# Patient Record
Sex: MALE | Race: WHITE | NOT HISPANIC OR LATINO | URBAN - METROPOLITAN AREA
[De-identification: names, ages, dates, MRNs, and addresses within clinical notes are randomized per-mention and may not be internally consistent; named-entity substitution may affect disease eponyms.]

---

## 2017-04-05 ENCOUNTER — OUTPATIENT (OUTPATIENT)
Dept: OUTPATIENT SERVICES | Facility: HOSPITAL | Age: 57
LOS: 1 days | End: 2017-04-05
Payer: COMMERCIAL

## 2017-04-05 ENCOUNTER — APPOINTMENT (OUTPATIENT)
Age: 57
End: 2017-04-05

## 2017-04-05 DIAGNOSIS — N20.0 CALCULUS OF KIDNEY: ICD-10-CM

## 2017-04-05 DIAGNOSIS — Z00.8 ENCOUNTER FOR OTHER GENERAL EXAMINATION: ICD-10-CM

## 2017-04-05 PROCEDURE — 76775 US EXAM ABDO BACK WALL LIM: CPT

## 2019-05-02 ENCOUNTER — RECORD ABSTRACTING (OUTPATIENT)
Age: 59
End: 2019-05-02

## 2019-05-02 ENCOUNTER — APPOINTMENT (OUTPATIENT)
Dept: CARDIOLOGY | Facility: CLINIC | Age: 59
End: 2019-05-02
Payer: COMMERCIAL

## 2019-05-02 ENCOUNTER — NON-APPOINTMENT (OUTPATIENT)
Age: 59
End: 2019-05-02

## 2019-05-02 VITALS
OXYGEN SATURATION: 99 % | SYSTOLIC BLOOD PRESSURE: 136 MMHG | HEIGHT: 68 IN | WEIGHT: 170 LBS | RESPIRATION RATE: 15 BRPM | DIASTOLIC BLOOD PRESSURE: 89 MMHG | HEART RATE: 67 BPM | BODY MASS INDEX: 25.76 KG/M2

## 2019-05-02 DIAGNOSIS — R74.8 ABNORMAL LEVELS OF OTHER SERUM ENZYMES: ICD-10-CM

## 2019-05-02 DIAGNOSIS — Z78.9 OTHER SPECIFIED HEALTH STATUS: ICD-10-CM

## 2019-05-02 DIAGNOSIS — Z87.898 PERSONAL HISTORY OF OTHER SPECIFIED CONDITIONS: ICD-10-CM

## 2019-05-02 DIAGNOSIS — I37.1 NONRHEUMATIC PULMONARY VALVE INSUFFICIENCY: ICD-10-CM

## 2019-05-02 DIAGNOSIS — Z82.49 FAMILY HISTORY OF ISCHEMIC HEART DISEASE AND OTHER DISEASES OF THE CIRCULATORY SYSTEM: ICD-10-CM

## 2019-05-02 DIAGNOSIS — I25.3 ANEURYSM OF HEART: ICD-10-CM

## 2019-05-02 DIAGNOSIS — Z00.00 ENCOUNTER FOR GENERAL ADULT MEDICAL EXAMINATION W/OUT ABNORMAL FINDINGS: ICD-10-CM

## 2019-05-02 DIAGNOSIS — I07.1 RHEUMATIC TRICUSPID INSUFFICIENCY: ICD-10-CM

## 2019-05-02 PROCEDURE — 99243 OFF/OP CNSLTJ NEW/EST LOW 30: CPT

## 2019-05-02 PROCEDURE — 93000 ELECTROCARDIOGRAM COMPLETE: CPT

## 2019-05-03 NOTE — REASON FOR VISIT
[FreeTextEntry1] : This is a 59-year-old male who presents here for cardiac evaluation.  His history includes that of:\par 1.  Strong family history of premature coronary artery disease.  \par 2.  Intraatrial septal aneurysm. \par 3.  Mild pulmonic and tricuspid insufficiency. \par \par He exercises somewhat regularly.  Does not experience any exertional chest pain, shortness of breath, palpitations, PND, orthopnea, or edema. He is a bit concerned about hearing that other seemingly healthy people have suddenly collapsed with heart attacks.  \par \par He denies any exertional symptoms.  No other significant medical issues.  \par \par  \par

## 2019-05-03 NOTE — ASSESSMENT
[FreeTextEntry1] : ECG:  Normal sinus rhythm at 63 bpm.  Rare PVC.  1o AV block.  No significant ST-T wave changes. \par \par Laboratory data includes a total cholesterol of 179. \par HDL 68. \par .\par A1C 5.6.  \par AST 87. \par ALT 57.  \par Hemoglobin 15.1.  \par \par Impression:  59-year-old male with a strong family history of premature coronary disease (father CABG in his 60s) presenting here with some minor ECG abnormalities, transaminase abnormalities, and history of some echocardiographic abnormalities including aneurysmal intraatrial septum and mild valvular insufficiency. \par \par I reviewed all of the above with the patient and made suggestions including:\par 1.  Reviewing with Dr. Faulkner the findings of his transaminase elevation. \par 2.  Repeating an echocardiogram to re-look at the echocardiographic abnormalities last seen more than a decade ago. \par 3.  Recommend an exercise stress test to risk stratify him in regard to his very aggressive exercise regimen.  I explained the above to Mr. Jack who agrees and will proceed shortly.  Will review with him the results over the phone.  Thank you for the opportunity to contribute to his care.  \par  \par

## 2019-07-22 ENCOUNTER — APPOINTMENT (OUTPATIENT)
Dept: CARDIOLOGY | Facility: CLINIC | Age: 59
End: 2019-07-22

## 2019-07-29 ENCOUNTER — APPOINTMENT (OUTPATIENT)
Dept: CARDIOLOGY | Facility: CLINIC | Age: 59
End: 2019-07-29
Payer: COMMERCIAL

## 2019-07-29 ENCOUNTER — APPOINTMENT (OUTPATIENT)
Dept: CARDIOLOGY | Facility: CLINIC | Age: 59
End: 2019-07-29

## 2019-07-29 PROCEDURE — 93015 CV STRESS TEST SUPVJ I&R: CPT

## 2019-07-29 PROCEDURE — 93306 TTE W/DOPPLER COMPLETE: CPT

## 2021-02-17 ENCOUNTER — NON-APPOINTMENT (OUTPATIENT)
Age: 61
End: 2021-02-17

## 2021-03-09 ENCOUNTER — APPOINTMENT (OUTPATIENT)
Dept: INTERNAL MEDICINE | Facility: CLINIC | Age: 61
End: 2021-03-09

## 2022-10-12 ENCOUNTER — OFFICE (OUTPATIENT)
Dept: URBAN - METROPOLITAN AREA CLINIC 115 | Facility: CLINIC | Age: 62
Setting detail: OPHTHALMOLOGY
End: 2022-10-12
Payer: COMMERCIAL

## 2022-10-12 DIAGNOSIS — G43.109: ICD-10-CM

## 2022-10-12 DIAGNOSIS — H47.012: ICD-10-CM

## 2022-10-12 DIAGNOSIS — H43.812: ICD-10-CM

## 2022-10-12 DIAGNOSIS — H35.033: ICD-10-CM

## 2022-10-12 DIAGNOSIS — H53.433: ICD-10-CM

## 2022-10-12 DIAGNOSIS — H47.212: ICD-10-CM

## 2022-10-12 DIAGNOSIS — H40.013: ICD-10-CM

## 2022-10-12 PROCEDURE — 76514 ECHO EXAM OF EYE THICKNESS: CPT | Performed by: OPHTHALMOLOGY

## 2022-10-12 PROCEDURE — 92133 CPTRZD OPH DX IMG PST SGM ON: CPT | Performed by: OPHTHALMOLOGY

## 2022-10-12 PROCEDURE — 99203 OFFICE O/P NEW LOW 30 MIN: CPT | Performed by: OPHTHALMOLOGY

## 2022-10-12 PROCEDURE — 92201 OPSCPY EXTND RTA DRAW UNI/BI: CPT | Performed by: OPHTHALMOLOGY

## 2022-10-12 PROCEDURE — 92083 EXTENDED VISUAL FIELD XM: CPT | Performed by: OPHTHALMOLOGY

## 2022-10-12 ASSESSMENT — REFRACTION_AUTOREFRACTION
OS_CYLINDER: -7.25
OD_AXIS: 088
OS_SPHERE: -2.75
OS_AXIS: 072
OD_CYLINDER: -7.00
OD_SPHERE: -2.25

## 2022-10-12 ASSESSMENT — REFRACTION_CURRENTRX
OS_VPRISM_DIRECTION: PROGS
OD_CYLINDER: -6.00
OS_CYLINDER: -4.50
OS_ADD: +2.25
OD_ADD: +2.25
OD_OVR_VA: 20/
OD_VPRISM_DIRECTION: PROGS
OD_AXIS: 077
OS_OVR_VA: 20/
OD_SPHERE: -3.00
OS_AXIS: 071
OS_SPHERE: -4.75

## 2022-10-12 ASSESSMENT — SPHEQUIV_DERIVED
OS_SPHEQUIV: -6.375
OD_SPHEQUIV: -5.75

## 2022-10-12 ASSESSMENT — PACHYMETRY
OS_CT_CORRECTION: 1
OS_CT_UM: 530
OD_CT_CORRECTION: 1
OD_CT_UM: 533

## 2022-10-12 ASSESSMENT — VISUAL ACUITY
OS_BCVA: 20/30-
OD_BCVA: 20/100-

## 2022-10-12 ASSESSMENT — CONFRONTATIONAL VISUAL FIELD TEST (CVF)
OS_FINDINGS: FULL
OD_FINDINGS: FULL

## 2022-11-02 ENCOUNTER — OFFICE (OUTPATIENT)
Dept: URBAN - METROPOLITAN AREA CLINIC 115 | Facility: CLINIC | Age: 62
Setting detail: OPHTHALMOLOGY
End: 2022-11-02
Payer: COMMERCIAL

## 2022-11-02 DIAGNOSIS — H53.433: ICD-10-CM

## 2022-11-02 DIAGNOSIS — H40.1121: ICD-10-CM

## 2022-11-02 DIAGNOSIS — H47.212: ICD-10-CM

## 2022-11-02 DIAGNOSIS — H47.012: ICD-10-CM

## 2022-11-02 DIAGNOSIS — G43.109: ICD-10-CM

## 2022-11-02 DIAGNOSIS — H40.1111: ICD-10-CM

## 2022-11-02 DIAGNOSIS — H43.812: ICD-10-CM

## 2022-11-02 PROBLEM — H40.013 GLAUCOMA SUSPECT, LOW RISK 1-2 FACTORS; BOTH EYES: Status: ACTIVE | Noted: 2022-10-12

## 2022-11-02 PROBLEM — H35.033 HYPERTENSIVE RETINOPATHY; BOTH EYES: Status: ACTIVE | Noted: 2022-10-12

## 2022-11-02 PROCEDURE — 99213 OFFICE O/P EST LOW 20 MIN: CPT | Performed by: OPHTHALMOLOGY

## 2022-11-02 ASSESSMENT — TONOMETRY
OS_IOP_MMHG: 12
OD_IOP_MMHG: 14
OS_IOP_MMHG: 18
OD_IOP_MMHG: 13

## 2022-11-02 ASSESSMENT — REFRACTION_CURRENTRX
OS_CYLINDER: -4.50
OD_CYLINDER: -6.00
OD_ADD: +2.25
OS_ADD: +2.25
OS_SPHERE: -4.75
OS_VPRISM_DIRECTION: PROGS
OS_AXIS: 071
OS_OVR_VA: 20/
OD_SPHERE: -3.00
OD_OVR_VA: 20/
OD_VPRISM_DIRECTION: PROGS
OD_AXIS: 077

## 2022-11-02 ASSESSMENT — VISUAL ACUITY
OD_BCVA: 20/60-1
OS_BCVA: 20/30-1

## 2022-11-02 ASSESSMENT — CONFRONTATIONAL VISUAL FIELD TEST (CVF)
OS_FINDINGS: FULL
OD_FINDINGS: FULL

## 2022-11-02 ASSESSMENT — REFRACTION_AUTOREFRACTION
OD_AXIS: 091
OD_SPHERE: -1.75
OS_CYLINDER: -7.00
OD_CYLINDER: -7.50
OS_AXIS: 082
OS_SPHERE: -3.25

## 2022-11-02 ASSESSMENT — PACHYMETRY
OS_CT_UM: 530
OS_CT_CORRECTION: 1
OD_CT_CORRECTION: 1
OD_CT_UM: 533

## 2022-11-02 ASSESSMENT — SPHEQUIV_DERIVED
OD_SPHEQUIV: -5.5
OS_SPHEQUIV: -6.75

## 2022-11-03 ENCOUNTER — APPOINTMENT (OUTPATIENT)
Dept: CARDIOLOGY | Facility: CLINIC | Age: 62
End: 2022-11-03

## 2022-11-03 VITALS
WEIGHT: 175 LBS | RESPIRATION RATE: 16 BRPM | DIASTOLIC BLOOD PRESSURE: 80 MMHG | BODY MASS INDEX: 26.52 KG/M2 | HEART RATE: 56 BPM | OXYGEN SATURATION: 97 % | HEIGHT: 68 IN | SYSTOLIC BLOOD PRESSURE: 126 MMHG

## 2022-11-03 PROCEDURE — 99214 OFFICE O/P EST MOD 30 MIN: CPT | Mod: 25

## 2022-11-03 PROCEDURE — 93000 ELECTROCARDIOGRAM COMPLETE: CPT

## 2022-11-03 NOTE — HISTORY OF PRESENT ILLNESS
[FreeTextEntry1] : In 2019 patient had an echocardiogram and a stress test, ever, there was no follow-up appointment scheduled.

## 2022-11-03 NOTE — ASSESSMENT
[FreeTextEntry1] : ECG:  Normal sinus rhythm at 56 bpm.  Rare PVC.  1o AV block.  Left anterior fascicular block, LVH, nonspecific T wave normalities\par \par Laboratory data\par ------12/13/18---10/1/8/22\par Chol----179.-------212\par HDL-----68.--------60\par LDL----100.-------141\par Y7Y----8.6.-------5.4  \par AST-----87. \par ALT------57.  \par Hemoglobin 15.1.  \par \par Stress test 7/29/2019:\par Time: 11 minutes (12 METS\par Heart rate: 173   (109%)\par Blood pressure response normal 164/76\par ECG: No ischemic changes.\par Ortega score 11 consistent with low risk\par \par Echocardiogram 7/29/2019:\par Normal LV size and function EF 60 to 65%\par Borderline eft atrial margin\par Moderate mitral valve prolapse and moderate mitral insufficiency\par Mild to moderate pulmonic insufficiency\par \par Impression:  62 -year-old male with a strong family history of premature coronary disease (father CABG in his 60s) presenting here with some minor ECG abnormalities, equivocal hyperlipidemia, transaminase abnormalities, and moderate MR\par 1.  Transaminase elevation has apparently resolved.\par 2.  Moderate mitral regurgitation, which represented an increase over prior.  No signs or symptoms related.\par 3.  Significant increase in LDL\par 4.  A history of obstructive sleep apnea which was getting reassess and currently not being treated\par \par \par Plan\par 1  .Repeat echocardiogram regarding the mitral regurgitation.\par \par 2.  Follow-up with sleep study and recommendations for treatment if necessary.\par \par 3.  Repeat blood work and consideration of statin therapy if persistently elevated.\par \par

## 2022-11-03 NOTE — REASON FOR VISIT
[FreeTextEntry1] : This is a 62 -year-old male who presents here for cardiac evaluation.  His history includes that of:\par 1.  Strong family history of premature coronary artery disease.  \par 2.  Intraatrial septal aneurysm. \par 3.  Mild pulmonic and tricuspid insufficiency. \par \par He exercises somewhat regularly.  Does not experience any exertional chest pain, shortness of breath, palpitations, PND, orthopnea, or edema. \par \par He denies any exertional symptoms.  No other significant medical issues.  \par \par  \par

## 2022-11-22 ENCOUNTER — APPOINTMENT (OUTPATIENT)
Dept: CARDIOLOGY | Facility: CLINIC | Age: 62
End: 2022-11-22

## 2022-11-22 PROCEDURE — 93306 TTE W/DOPPLER COMPLETE: CPT

## 2022-12-02 ENCOUNTER — APPOINTMENT (OUTPATIENT)
Dept: PULMONOLOGY | Facility: CLINIC | Age: 62
End: 2022-12-02

## 2022-12-02 VITALS
BODY MASS INDEX: 26.52 KG/M2 | SYSTOLIC BLOOD PRESSURE: 124 MMHG | OXYGEN SATURATION: 97 % | RESPIRATION RATE: 16 BRPM | WEIGHT: 175 LBS | DIASTOLIC BLOOD PRESSURE: 78 MMHG | HEIGHT: 68 IN | HEART RATE: 78 BPM

## 2022-12-02 PROCEDURE — 99203 OFFICE O/P NEW LOW 30 MIN: CPT

## 2022-12-02 NOTE — CONSULT LETTER
[Dear  ___] : Dear  [unfilled], [Consult Letter:] : I had the pleasure of evaluating your patient, [unfilled]. [Please see my note below.] : Please see my note below. [Consult Closing:] : Thank you very much for allowing me to participate in the care of this patient.  If you have any questions, please do not hesitate to contact me. [Sincerely,] : Sincerely, [DrScotty  ___] : Dr. HARDEN

## 2022-12-27 NOTE — DISCUSSION/SUMMARY
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

## 2022-12-27 NOTE — HISTORY OF PRESENT ILLNESS
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

## 2022-12-27 NOTE — PHYSICAL EXAM
[No Acute Distress] : no acute distress [Enlarged Base of the Tongue] : enlarged base of the tongue [Retrognathia] : retrognathia [TextBox_11] : High arched palate

## 2022-12-28 ENCOUNTER — APPOINTMENT (OUTPATIENT)
Dept: CARDIOLOGY | Facility: CLINIC | Age: 62
End: 2022-12-28

## 2023-02-02 ENCOUNTER — APPOINTMENT (OUTPATIENT)
Dept: PULMONOLOGY | Facility: CLINIC | Age: 63
End: 2023-02-02
Payer: COMMERCIAL

## 2023-02-02 ENCOUNTER — INPATIENT (INPATIENT)
Facility: HOSPITAL | Age: 63
LOS: 2 days | Discharge: HOME | DRG: 176 | End: 2023-02-05
Attending: HOSPITALIST | Admitting: FAMILY MEDICINE
Payer: COMMERCIAL

## 2023-02-02 VITALS
TEMPERATURE: 99 F | HEART RATE: 91 BPM | HEIGHT: 68 IN | RESPIRATION RATE: 18 BRPM | DIASTOLIC BLOOD PRESSURE: 96 MMHG | OXYGEN SATURATION: 98 % | SYSTOLIC BLOOD PRESSURE: 151 MMHG | WEIGHT: 169.98 LBS

## 2023-02-02 DIAGNOSIS — R06.83 SNORING: ICD-10-CM

## 2023-02-02 DIAGNOSIS — G47.19 OTHER HYPERSOMNIA: ICD-10-CM

## 2023-02-02 PROCEDURE — 99442: CPT

## 2023-02-02 PROCEDURE — 99285 EMERGENCY DEPT VISIT HI MDM: CPT

## 2023-02-02 NOTE — ED ADULT TRIAGE NOTE - CHIEF COMPLAINT QUOTE
right sided chest pain and rib pain. Denies SOB, palpitations, fever/chills, nausea, vomiting, abd pain. denies cardiac hx.

## 2023-02-02 NOTE — HISTORY OF PRESENT ILLNESS
[Home] : at home, [unfilled] , at the time of the visit. 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

## 2023-02-03 DIAGNOSIS — I26.99 OTHER PULMONARY EMBOLISM WITHOUT ACUTE COR PULMONALE: ICD-10-CM

## 2023-02-03 LAB
ADD ON TEST-SPECIMEN IN LAB: SIGNIFICANT CHANGE UP
ADD ON TEST-SPECIMEN IN LAB: SIGNIFICANT CHANGE UP
ALBUMIN SERPL ELPH-MCNC: 3.3 G/DL — SIGNIFICANT CHANGE UP (ref 3.3–5)
ALP SERPL-CCNC: 72 U/L — SIGNIFICANT CHANGE UP (ref 40–120)
ALT FLD-CCNC: 31 U/L — SIGNIFICANT CHANGE UP (ref 12–78)
ANION GAP SERPL CALC-SCNC: 5 MMOL/L — SIGNIFICANT CHANGE UP (ref 5–17)
ANION GAP SERPL CALC-SCNC: 5 MMOL/L — SIGNIFICANT CHANGE UP (ref 5–17)
APTT BLD: 151.5 SEC — CRITICAL HIGH (ref 27.5–35.5)
APTT BLD: 29.5 SEC — SIGNIFICANT CHANGE UP (ref 27.5–35.5)
APTT BLD: 44.3 SEC — HIGH (ref 27.5–35.5)
AST SERPL-CCNC: 15 U/L — SIGNIFICANT CHANGE UP (ref 15–37)
BASOPHILS # BLD AUTO: 0.08 K/UL — SIGNIFICANT CHANGE UP (ref 0–0.2)
BASOPHILS # BLD AUTO: 0.08 K/UL — SIGNIFICANT CHANGE UP (ref 0–0.2)
BASOPHILS NFR BLD AUTO: 0.6 % — SIGNIFICANT CHANGE UP (ref 0–2)
BASOPHILS NFR BLD AUTO: 0.7 % — SIGNIFICANT CHANGE UP (ref 0–2)
BILIRUB SERPL-MCNC: 1.4 MG/DL — HIGH (ref 0.2–1.2)
BUN SERPL-MCNC: 17 MG/DL — SIGNIFICANT CHANGE UP (ref 7–23)
BUN SERPL-MCNC: 19 MG/DL — SIGNIFICANT CHANGE UP (ref 7–23)
CALCIUM SERPL-MCNC: 8.9 MG/DL — SIGNIFICANT CHANGE UP (ref 8.5–10.1)
CALCIUM SERPL-MCNC: 9.2 MG/DL — SIGNIFICANT CHANGE UP (ref 8.5–10.1)
CHLORIDE SERPL-SCNC: 103 MMOL/L — SIGNIFICANT CHANGE UP (ref 96–108)
CHLORIDE SERPL-SCNC: 103 MMOL/L — SIGNIFICANT CHANGE UP (ref 96–108)
CO2 SERPL-SCNC: 29 MMOL/L — SIGNIFICANT CHANGE UP (ref 22–31)
CO2 SERPL-SCNC: 30 MMOL/L — SIGNIFICANT CHANGE UP (ref 22–31)
CREAT SERPL-MCNC: 1.21 MG/DL — SIGNIFICANT CHANGE UP (ref 0.5–1.3)
CREAT SERPL-MCNC: 1.23 MG/DL — SIGNIFICANT CHANGE UP (ref 0.5–1.3)
D DIMER BLD IA.RAPID-MCNC: 304 NG/ML DDU — HIGH
EGFR: 66 ML/MIN/1.73M2 — SIGNIFICANT CHANGE UP
EGFR: 67 ML/MIN/1.73M2 — SIGNIFICANT CHANGE UP
EOSINOPHIL # BLD AUTO: 0.42 K/UL — SIGNIFICANT CHANGE UP (ref 0–0.5)
EOSINOPHIL # BLD AUTO: 0.45 K/UL — SIGNIFICANT CHANGE UP (ref 0–0.5)
EOSINOPHIL NFR BLD AUTO: 3.1 % — SIGNIFICANT CHANGE UP (ref 0–6)
EOSINOPHIL NFR BLD AUTO: 3.8 % — SIGNIFICANT CHANGE UP (ref 0–6)
FLUAV AG NPH QL: SIGNIFICANT CHANGE UP
FLUBV AG NPH QL: SIGNIFICANT CHANGE UP
GLUCOSE SERPL-MCNC: 117 MG/DL — HIGH (ref 70–99)
GLUCOSE SERPL-MCNC: 137 MG/DL — HIGH (ref 70–99)
HCT VFR BLD CALC: 46.3 % — SIGNIFICANT CHANGE UP (ref 39–50)
HCT VFR BLD CALC: 47.1 % — SIGNIFICANT CHANGE UP (ref 39–50)
HGB BLD-MCNC: 15.2 G/DL — SIGNIFICANT CHANGE UP (ref 13–17)
HGB BLD-MCNC: 16.1 G/DL — SIGNIFICANT CHANGE UP (ref 13–17)
IMM GRANULOCYTES NFR BLD AUTO: 0.3 % — SIGNIFICANT CHANGE UP (ref 0–0.9)
IMM GRANULOCYTES NFR BLD AUTO: 0.4 % — SIGNIFICANT CHANGE UP (ref 0–0.9)
INR BLD: 1.09 RATIO — SIGNIFICANT CHANGE UP (ref 0.88–1.16)
LYMPHOCYTES # BLD AUTO: 18.5 % — SIGNIFICANT CHANGE UP (ref 13–44)
LYMPHOCYTES # BLD AUTO: 2.54 K/UL — SIGNIFICANT CHANGE UP (ref 1–3.3)
LYMPHOCYTES # BLD AUTO: 2.92 K/UL — SIGNIFICANT CHANGE UP (ref 1–3.3)
LYMPHOCYTES # BLD AUTO: 24.4 % — SIGNIFICANT CHANGE UP (ref 13–44)
MCHC RBC-ENTMCNC: 29.3 PG — SIGNIFICANT CHANGE UP (ref 27–34)
MCHC RBC-ENTMCNC: 30.1 PG — SIGNIFICANT CHANGE UP (ref 27–34)
MCHC RBC-ENTMCNC: 32.8 GM/DL — SIGNIFICANT CHANGE UP (ref 32–36)
MCHC RBC-ENTMCNC: 34.2 GM/DL — SIGNIFICANT CHANGE UP (ref 32–36)
MCV RBC AUTO: 88 FL — SIGNIFICANT CHANGE UP (ref 80–100)
MCV RBC AUTO: 89.2 FL — SIGNIFICANT CHANGE UP (ref 80–100)
MONOCYTES # BLD AUTO: 1.06 K/UL — HIGH (ref 0–0.9)
MONOCYTES # BLD AUTO: 1.28 K/UL — HIGH (ref 0–0.9)
MONOCYTES NFR BLD AUTO: 8.9 % — SIGNIFICANT CHANGE UP (ref 2–14)
MONOCYTES NFR BLD AUTO: 9.3 % — SIGNIFICANT CHANGE UP (ref 2–14)
NEUTROPHILS # BLD AUTO: 7.41 K/UL — HIGH (ref 1.8–7.4)
NEUTROPHILS # BLD AUTO: 9.34 K/UL — HIGH (ref 1.8–7.4)
NEUTROPHILS NFR BLD AUTO: 61.9 % — SIGNIFICANT CHANGE UP (ref 43–77)
NEUTROPHILS NFR BLD AUTO: 68.1 % — SIGNIFICANT CHANGE UP (ref 43–77)
NT-PROBNP SERPL-SCNC: 80 PG/ML — SIGNIFICANT CHANGE UP (ref 0–125)
PLATELET # BLD AUTO: 235 K/UL — SIGNIFICANT CHANGE UP (ref 150–400)
PLATELET # BLD AUTO: 263 K/UL — SIGNIFICANT CHANGE UP (ref 150–400)
POTASSIUM SERPL-MCNC: 3.5 MMOL/L — SIGNIFICANT CHANGE UP (ref 3.5–5.3)
POTASSIUM SERPL-MCNC: 3.7 MMOL/L — SIGNIFICANT CHANGE UP (ref 3.5–5.3)
POTASSIUM SERPL-SCNC: 3.5 MMOL/L — SIGNIFICANT CHANGE UP (ref 3.5–5.3)
POTASSIUM SERPL-SCNC: 3.7 MMOL/L — SIGNIFICANT CHANGE UP (ref 3.5–5.3)
PROT SERPL-MCNC: 7.1 GM/DL — SIGNIFICANT CHANGE UP (ref 6–8.3)
PROTHROM AB SERPL-ACNC: 12.7 SEC — SIGNIFICANT CHANGE UP (ref 10.5–13.4)
RBC # BLD: 5.19 M/UL — SIGNIFICANT CHANGE UP (ref 4.2–5.8)
RBC # BLD: 5.35 M/UL — SIGNIFICANT CHANGE UP (ref 4.2–5.8)
RBC # FLD: 13.2 % — SIGNIFICANT CHANGE UP (ref 10.3–14.5)
RBC # FLD: 13.3 % — SIGNIFICANT CHANGE UP (ref 10.3–14.5)
RSV RNA NPH QL NAA+NON-PROBE: SIGNIFICANT CHANGE UP
SARS-COV-2 RNA SPEC QL NAA+PROBE: SIGNIFICANT CHANGE UP
SODIUM SERPL-SCNC: 137 MMOL/L — SIGNIFICANT CHANGE UP (ref 135–145)
SODIUM SERPL-SCNC: 138 MMOL/L — SIGNIFICANT CHANGE UP (ref 135–145)
TROPONIN I, HIGH SENSITIVITY RESULT: 6.13 NG/L — SIGNIFICANT CHANGE UP
WBC # BLD: 11.96 K/UL — HIGH (ref 3.8–10.5)
WBC # BLD: 13.71 K/UL — HIGH (ref 3.8–10.5)
WBC # FLD AUTO: 11.96 K/UL — HIGH (ref 3.8–10.5)
WBC # FLD AUTO: 13.71 K/UL — HIGH (ref 3.8–10.5)

## 2023-02-03 PROCEDURE — 71275 CT ANGIOGRAPHY CHEST: CPT | Mod: 26,MA

## 2023-02-03 PROCEDURE — 85025 COMPLETE CBC W/AUTO DIFF WBC: CPT

## 2023-02-03 PROCEDURE — 36415 COLL VENOUS BLD VENIPUNCTURE: CPT

## 2023-02-03 PROCEDURE — 93306 TTE W/DOPPLER COMPLETE: CPT

## 2023-02-03 PROCEDURE — 99223 1ST HOSP IP/OBS HIGH 75: CPT

## 2023-02-03 PROCEDURE — 86803 HEPATITIS C AB TEST: CPT

## 2023-02-03 PROCEDURE — 80053 COMPREHEN METABOLIC PANEL: CPT

## 2023-02-03 PROCEDURE — 93970 EXTREMITY STUDY: CPT | Mod: 26

## 2023-02-03 PROCEDURE — 93010 ELECTROCARDIOGRAM REPORT: CPT

## 2023-02-03 PROCEDURE — 97162 PT EVAL MOD COMPLEX 30 MIN: CPT | Mod: GP

## 2023-02-03 PROCEDURE — 83036 HEMOGLOBIN GLYCOSYLATED A1C: CPT

## 2023-02-03 PROCEDURE — 97116 GAIT TRAINING THERAPY: CPT | Mod: GP

## 2023-02-03 PROCEDURE — 85027 COMPLETE CBC AUTOMATED: CPT

## 2023-02-03 PROCEDURE — 80048 BASIC METABOLIC PNL TOTAL CA: CPT

## 2023-02-03 PROCEDURE — 93306 TTE W/DOPPLER COMPLETE: CPT | Mod: 26

## 2023-02-03 PROCEDURE — 97530 THERAPEUTIC ACTIVITIES: CPT | Mod: GP

## 2023-02-03 PROCEDURE — 71045 X-RAY EXAM CHEST 1 VIEW: CPT | Mod: 26

## 2023-02-03 PROCEDURE — 85730 THROMBOPLASTIN TIME PARTIAL: CPT

## 2023-02-03 RX ORDER — OXYCODONE AND ACETAMINOPHEN 5; 325 MG/1; MG/1
1 TABLET ORAL
Qty: 0 | Refills: 0 | DISCHARGE

## 2023-02-03 RX ORDER — LANOLIN ALCOHOL/MO/W.PET/CERES
3 CREAM (GRAM) TOPICAL AT BEDTIME
Refills: 0 | Status: DISCONTINUED | OUTPATIENT
Start: 2023-02-03 | End: 2023-02-05

## 2023-02-03 RX ORDER — HEPARIN SODIUM 5000 [USP'U]/ML
INJECTION INTRAVENOUS; SUBCUTANEOUS
Qty: 25000 | Refills: 0 | Status: DISCONTINUED | OUTPATIENT
Start: 2023-02-03 | End: 2023-02-03

## 2023-02-03 RX ORDER — CYCLOBENZAPRINE HYDROCHLORIDE 10 MG/1
1 TABLET, FILM COATED ORAL
Qty: 0 | Refills: 0 | DISCHARGE

## 2023-02-03 RX ORDER — BRIMONIDINE TARTRATE 2 MG/MG
1 SOLUTION/ DROPS OPHTHALMIC
Refills: 0 | Status: DISCONTINUED | OUTPATIENT
Start: 2023-02-03 | End: 2023-02-05

## 2023-02-03 RX ORDER — CYCLOBENZAPRINE HYDROCHLORIDE 10 MG/1
10 TABLET, FILM COATED ORAL THREE TIMES A DAY
Refills: 0 | Status: DISCONTINUED | OUTPATIENT
Start: 2023-02-03 | End: 2023-02-05

## 2023-02-03 RX ORDER — LATANOPROST 0.05 MG/ML
1 SOLUTION/ DROPS OPHTHALMIC; TOPICAL AT BEDTIME
Refills: 0 | Status: DISCONTINUED | OUTPATIENT
Start: 2023-02-03 | End: 2023-02-05

## 2023-02-03 RX ORDER — APIXABAN 2.5 MG/1
10 TABLET, FILM COATED ORAL
Refills: 0 | Status: DISCONTINUED | OUTPATIENT
Start: 2023-02-03 | End: 2023-02-05

## 2023-02-03 RX ORDER — OXYCODONE AND ACETAMINOPHEN 5; 325 MG/1; MG/1
2 TABLET ORAL EVERY 4 HOURS
Refills: 0 | Status: DISCONTINUED | OUTPATIENT
Start: 2023-02-03 | End: 2023-02-05

## 2023-02-03 RX ORDER — HEPARIN SODIUM 5000 [USP'U]/ML
3000 INJECTION INTRAVENOUS; SUBCUTANEOUS EVERY 6 HOURS
Refills: 0 | Status: DISCONTINUED | OUTPATIENT
Start: 2023-02-03 | End: 2023-02-04

## 2023-02-03 RX ORDER — HEPARIN SODIUM 5000 [USP'U]/ML
6500 INJECTION INTRAVENOUS; SUBCUTANEOUS ONCE
Refills: 0 | Status: COMPLETED | OUTPATIENT
Start: 2023-02-03 | End: 2023-02-03

## 2023-02-03 RX ORDER — ACETAMINOPHEN 500 MG
650 TABLET ORAL EVERY 6 HOURS
Refills: 0 | Status: DISCONTINUED | OUTPATIENT
Start: 2023-02-03 | End: 2023-02-05

## 2023-02-03 RX ORDER — ONDANSETRON 8 MG/1
4 TABLET, FILM COATED ORAL EVERY 8 HOURS
Refills: 0 | Status: DISCONTINUED | OUTPATIENT
Start: 2023-02-03 | End: 2023-02-05

## 2023-02-03 RX ORDER — HEPARIN SODIUM 5000 [USP'U]/ML
6500 INJECTION INTRAVENOUS; SUBCUTANEOUS EVERY 6 HOURS
Refills: 0 | Status: DISCONTINUED | OUTPATIENT
Start: 2023-02-03 | End: 2023-02-03

## 2023-02-03 RX ORDER — ASPIRIN/CALCIUM CARB/MAGNESIUM 324 MG
162 TABLET ORAL ONCE
Refills: 0 | Status: COMPLETED | OUTPATIENT
Start: 2023-02-03 | End: 2023-02-03

## 2023-02-03 RX ADMIN — BRIMONIDINE TARTRATE 1 DROP(S): 2 SOLUTION/ DROPS OPHTHALMIC at 15:06

## 2023-02-03 RX ADMIN — LATANOPROST 1 DROP(S): 0.05 SOLUTION/ DROPS OPHTHALMIC; TOPICAL at 21:57

## 2023-02-03 RX ADMIN — HEPARIN SODIUM 3000 UNIT(S): 5000 INJECTION INTRAVENOUS; SUBCUTANEOUS at 20:24

## 2023-02-03 RX ADMIN — BRIMONIDINE TARTRATE 1 DROP(S): 2 SOLUTION/ DROPS OPHTHALMIC at 21:57

## 2023-02-03 RX ADMIN — HEPARIN SODIUM 1100 UNIT(S)/HR: 5000 INJECTION INTRAVENOUS; SUBCUTANEOUS at 19:06

## 2023-02-03 RX ADMIN — Medication 162 MILLIGRAM(S): at 01:54

## 2023-02-03 RX ADMIN — APIXABAN 10 MILLIGRAM(S): 2.5 TABLET, FILM COATED ORAL at 21:57

## 2023-02-03 RX ADMIN — HEPARIN SODIUM 1400 UNIT(S)/HR: 5000 INJECTION INTRAVENOUS; SUBCUTANEOUS at 03:48

## 2023-02-03 RX ADMIN — HEPARIN SODIUM 1300 UNIT(S)/HR: 5000 INJECTION INTRAVENOUS; SUBCUTANEOUS at 20:13

## 2023-02-03 RX ADMIN — HEPARIN SODIUM 0 UNIT(S)/HR: 5000 INJECTION INTRAVENOUS; SUBCUTANEOUS at 10:31

## 2023-02-03 RX ADMIN — HEPARIN SODIUM 1400 UNIT(S)/HR: 5000 INJECTION INTRAVENOUS; SUBCUTANEOUS at 06:56

## 2023-02-03 RX ADMIN — HEPARIN SODIUM 1100 UNIT(S)/HR: 5000 INJECTION INTRAVENOUS; SUBCUTANEOUS at 12:04

## 2023-02-03 RX ADMIN — HEPARIN SODIUM 6500 UNIT(S): 5000 INJECTION INTRAVENOUS; SUBCUTANEOUS at 03:46

## 2023-02-03 NOTE — PATIENT PROFILE ADULT - FUNCTIONAL ASSESSMENT - BASIC MOBILITY 1.
Dr Mcnair came to assess baby rash. Ok to discharge as anticipated.   4 = No assist / stand by assistance

## 2023-02-03 NOTE — ED ADULT NURSE NOTE - CAS EDN DISCHARGE ASSESSMENT
A (Catheter 6fr Jr5 Crv 100cm Radopq Braid Slct Sup) catheter was used to cross the aortic valve and placed in the left ventricle.  LV pressure was recorded and measured.  Alert and oriented to person, place and time

## 2023-02-03 NOTE — ED PROVIDER NOTE - CLINICAL SUMMARY MEDICAL DECISION MAKING FREE TEXT BOX
62-year-old male with no past medical history presents with acute onset of right-sided chest pain.  Pain is pleuritic in nature.  No associated symptoms.  Pain does not radiate.  No trauma.  No rash to the skin.  Will obtain labs to rule out ACS, pneumonia, pneumothorax as well as PE.  Patient does not PERC out.  Hemodynamically stable.  Labs notable for mildly elevated D-dimer.  CTA chest ordered and results noted with multisegmental right-sided PE.  Discussed results with patient and his wife at bedside.  He added that he had a massage 2 days ago and showed me a picture of a x-ray of his right hip and femur that he had not too long ago for pain radiating down his leg.  Added bilateral lower extremity Dopplers and obtained a actual weight of the patient.  Heparin ordered.  PERC team consulted.  Patient does not need any acute intervention other than anticoagulation at this time.  Admitted to hospitalist on telemetry.  Signout given to Dr. Carlisle.

## 2023-02-03 NOTE — ED ADULT NURSE REASSESSMENT NOTE - NS ED NURSE REASSESS COMMENT FT1
Pt received A&Ox4 VSS afebrile. Pt resting comfortably denies CP/SOB. O2 removed pt satting 97% on room air. Plan of care reviewed, orders pending. Bed assignment pending-breakfast to be ordered. Call bell in reach.

## 2023-02-03 NOTE — H&P ADULT - NSHPREVIEWOFSYSTEMS_GEN_ALL_CORE
REVIEW OF SYSTEMS:    CONSTITUTIONAL: No weakness, fevers or chills  EYES/ENT: No visual changes;  No vertigo or throat pain   NECK: No pain or stiffness  RESPIRATORY: No cough, wheezing, hemoptysis; No shortness of breath, + R sided pleuritic chest pain   CARDIOVASCULAR: No chest pain or palpitations  GASTROINTESTINAL: No abdominal or epigastric pain. No nausea, vomiting, or hematemesis; No diarrhea or constipation. No melena or hematochezia.  GENITOURINARY: No dysuria, frequency or hematuria  NEUROLOGICAL: No numbness or weakness  SKIN: No itching, rashes

## 2023-02-03 NOTE — CONSULT NOTE ADULT - SUBJECTIVE AND OBJECTIVE BOX
PCP:    REQUESTING PHYSICIAN:    REASON FOR CONSULT:    CHIEF COMPLAINT:    HPI:  62 M with Hx lumbar radiculopathy, L peripheral vision loss, presented to ER last night for R sided chest pain which started after dinner. Patient reports watching TV when he suddenly started to experience pain to R side of chest, pain exacerbated with deep inhalation and changes in position. He also reported feeling feverish, with temp 99F checked with thermometer, for which he took tylenol. No other accompanying symptoms reported. No recent trauma/travel/ prolonged immobilization. He reports lower back pain with radiation to R hip and thigh for which he sees ortho, recent Xray with some abnormality, however, MRI of thigh was negative. Pending MRI lower back. He reported going for a deep tissue massage on Monday. No personal or family hx VTE.     In ER, Vitals: BP: 151/96, T: 98.6F, RR: 18/min, HR: 91/min, CT Angio chest: Filling defects identified within right lobar artery extending to right lower lobe proximal segmental as well as right middle lobe segmental to subsegmental branches, started on hepatin gtt.  (03 Feb 2023 08:47). Cardiology called to evaluate PE and R sided strain. Pt denies exertional symptoms prior to this admission. He denies history of CV disease. Echo and labs were evaluated. Dtr at bedside.      PAST MEDICAL & SURGICAL HISTORY:  No pertinent past medical history      No significant past surgical history          Allergies    No Known Allergies    Intolerances        SOCIAL HISTORY:    FAMILY HISTORY:      MEDICATIONS:  MEDICATIONS  (STANDING):  brimonidine 0.2% Ophthalmic Solution 1 Drop(s) Both EYES two times a day  heparin  Infusion.  Unit(s)/Hr (14 mL/Hr) IV Continuous <Continuous>  latanoprost 0.005% Ophthalmic Solution 1 Drop(s) Both EYES at bedtime    MEDICATIONS  (PRN):  acetaminophen     Tablet .. 650 milliGRAM(s) Oral every 6 hours PRN Temp greater or equal to 38C (100.4F), Mild Pain (1 - 3)  aluminum hydroxide/magnesium hydroxide/simethicone Suspension 30 milliLiter(s) Oral every 4 hours PRN Dyspepsia  cyclobenzaprine 10 milliGRAM(s) Oral three times a day PRN Muscle Spasm  heparin   Injectable 6500 Unit(s) IV Push every 6 hours PRN For aPTT less than 40  heparin   Injectable 3000 Unit(s) IV Push every 6 hours PRN For aPTT between 40 - 57  melatonin 3 milliGRAM(s) Oral at bedtime PRN Insomnia  ondansetron Injectable 4 milliGRAM(s) IV Push every 8 hours PRN Nausea and/or Vomiting  oxycodone    5 mG/acetaminophen 325 mG 2 Tablet(s) Oral every 4 hours PRN Severe Pain (7 - 10)  oxycodone    5 mG/acetaminophen 325 mG 1 Tablet(s) Oral every 6 hours PRN Moderate Pain (4 - 6)      REVIEW OF SYSTEMS:    CONSTITUTIONAL: No weakness, fevers or chills  EYES/ENT: No visual changes;  No vertigo or throat pain   NECK: No pain or stiffness  RESPIRATORY: No cough, wheezing, hemoptysis; No shortness of breath  CARDIOVASCULAR: Chest pain  GASTROINTESTINAL: No abdominal or epigastric pain. No nausea, vomiting, or hematemesis; No diarrhea or constipation. No melena or hematochezia.  GENITOURINARY: No dysuria, frequency or hematuria  NEUROLOGICAL: No numbness or weakness  SKIN: No itching, burning, rashes, or lesions   All other review of systems is negative unless indicated above    Vital Signs Last 24 Hrs  T(C): 37.1 (03 Feb 2023 05:00), Max: 37.1 (03 Feb 2023 05:00)  T(F): 98.7 (03 Feb 2023 05:00), Max: 98.7 (03 Feb 2023 05:00)  HR: 58 (03 Feb 2023 06:00) (58 - 95)  BP: 138/91 (03 Feb 2023 06:00) (133/96 - 152/80)  BP(mean): 101 (03 Feb 2023 01:30) (101 - 111)  RR: 16 (03 Feb 2023 07:35) (16 - 22)  SpO2: 95% (03 Feb 2023 07:35) (95% - 98%)    Parameters below as of 03 Feb 2023 07:35  Patient On (Oxygen Delivery Method): room air        I&O's Summary      PHYSICAL EXAM:    Constitutional: NAD, awake and alert, well-developed  HEENT: PERR, EOMI,  No oral cyananosis.  Neck:  supple,  No JVD  Respiratory: Breath sounds are clear bilaterally, No wheezing, rales or rhonchi  Cardiovascular: S1 and S2, regular rate and rhythm, no Murmurs, gallops or rubs  Gastrointestinal: Bowel Sounds present, soft, nontender.   Extremities: No peripheral edema. No clubbing or cyanosis.  Vascular: 2+ peripheral pulses  Neurological: A/O x 3, no focal deficits  Musculoskeletal: no calf tenderness.  Skin: No rashes.      LABS: All Labs Reviewed:                        15.2   11.96 )-----------( 235      ( 03 Feb 2023 08:00 )             46.3                         16.1   13.71 )-----------( 263      ( 03 Feb 2023 00:05 )             47.1     03 Feb 2023 08:00    138    |  103    |  17     ----------------------------<  117    3.7     |  30     |  1.21   03 Feb 2023 00:05    137    |  103    |  19     ----------------------------<  137    3.5     |  29     |  1.23     Ca    9.2        03 Feb 2023 08:00  Ca    8.9        03 Feb 2023 00:05    TPro  7.1    /  Alb  3.3    /  TBili  1.4    /  DBili  x      /  AST  15     /  ALT  31     /  AlkPhos  72     03 Feb 2023 00:05    PT/INR - ( 03 Feb 2023 00:05 )   PT: 12.7 sec;   INR: 1.09 ratio         PTT - ( 03 Feb 2023 00:05 )  PTT:29.5 sec      Blood Culture:   02-03 @ 00:05  Pro Bnp 80        RADIOLOGY/EKG:< from: 12 Lead ECG (02.03.23 @ 00:05) >    Diagnosis Line Normal sinus rhythm  Left anterior fascicular block  Moderate voltage criteria for LVH, may be normal variant  Cannot rule out Septal infarct , age undetermined  Abnormal ECG  No previous ECGs available  Confirmed by Naman Hackett MD ([id number]) on 2/3/2023 9:25:04 AM    < end of copied text >    < from: TTE Echo Complete w/o Contrast w/ Doppler (02.03.23 @ 08:34) >  Impression     Summary     The left ventricle is normal in size, wall thickness, wall motion and   contractility.   Estimated left ventricular ejection fraction is 55-60 %.   The right ventricle is normal in size, wall thickness, wall motion, and   contractility based on TAPSE and tissue doppler.   Normal aortic valve structure.   Mild (1+) aortic regurgitation is present.   Normal appearing tricuspid valve structure and function.   Trace tricuspid valve regurgitation is present.     Signature     ----------------------------------------------------------------   Electronically signed by Eagle Horowitz MD(Interpreting   physician) on 02/03/2023 09:39 AM    < end of copied text >

## 2023-02-03 NOTE — ED ADULT NURSE REASSESSMENT NOTE - NS ED NURSE REASSESS COMMENT FT1
Critical PTT came back-Heparin scanned and held x1 hour and decreased by 3 to 11units/hr. Dr. Vazquez made aware. Plan is to start Eliquis tonight and dc to home tomorrow, pt and wife aware. All needs addressed, report to be given to floor and pt transported.

## 2023-02-03 NOTE — H&P ADULT - CLICK TO LAUNCH ORM
- add phentermine 4 mg in the morning  - continue topiramate 150 mg daily  - drink more water  - no more than 3 can of diet soda per day  - no more than one 8oz glass of juice per day  - walk regularly    - see Lauren Bloch in 1 months  - see me in 3 months    If you have any questions, please do not hesitate to call Weight management clinic at [phone] or [phone]    Sincerely,    Chasidy Juarez MD  Endocrinology         .

## 2023-02-03 NOTE — PATIENT PROFILE ADULT - FALL HARM RISK - UNIVERSAL INTERVENTIONS
Bed in lowest position, wheels locked, appropriate side rails in place/Call bell, personal items and telephone in reach/Instruct patient to call for assistance before getting out of bed or chair/Non-slip footwear when patient is out of bed/[place] to call system/Physically safe environment - no spills, clutter or unnecessary equipment/Purposeful Proactive Rounding/Room/bathroom lighting operational, light cord in reach

## 2023-02-03 NOTE — H&P ADULT - NSHPPHYSICALEXAM_GEN_ALL_CORE
Vital Signs Last 24 Hrs  T(C): 37.1 (03 Feb 2023 05:00), Max: 37.1 (03 Feb 2023 05:00)  T(F): 98.7 (03 Feb 2023 05:00), Max: 98.7 (03 Feb 2023 05:00)  HR: 58 (03 Feb 2023 06:00) (58 - 95)  BP: 138/91 (03 Feb 2023 06:00) (133/96 - 152/80)  BP(mean): 101 (03 Feb 2023 01:30) (101 - 111)  RR: 16 (03 Feb 2023 07:35) (16 - 22)  SpO2: 95% (03 Feb 2023 07:35) (95% - 98%)    Parameters below as of 03 Feb 2023 07:35  Patient On (Oxygen Delivery Method): room air    PHYSICAL EXAM:  GENERAL: NAD, lying in bed comfortably  HEAD:  Atraumatic, Normocephalic  EYES: conjunctiva and sclera clear  ENT: Moist mucous membranes  NECK: Supple, No JVD  CHEST/LUNG: Clear to auscultation bilaterally; No rales, rhonchi, wheezing, or rubs. Unlabored respirations  HEART: Regular rate and rhythm; No murmurs, rubs, or gallops  ABDOMEN: Bowel sounds present; Soft, Nontender, Nondistended.   EXTREMITIES:  2+ Peripheral Pulses, brisk capillary refill. No clubbing, cyanosis, or edema  NERVOUS SYSTEM:  Alert & Oriented X3, speech clear. No deficits   MSK: FROM all 4 extremities, full and equal strength

## 2023-02-03 NOTE — CHART NOTE - NSCHARTNOTEFT_GEN_A_CORE
Contacted by RN that pt Heparin is still running and Pt is due for eliquis at 10 pm. Advised RN to hold eliquis for now since the pt is already on heparin drip. Please transition to DOAC tomorrow as indicated

## 2023-02-03 NOTE — ED ADULT NURSE REASSESSMENT NOTE - NS ED NURSE REASSESS COMMENT FT1
PT LYING IN BED COMFORTABLY POST HEPARIN INFUSION. PT DENIES ANY C/O. VSS. WILL CONTINUE TO MONITOR.

## 2023-02-03 NOTE — CONSULT NOTE ADULT - PROBLEM SELECTOR RECOMMENDATION 9
PE noted on CT. Pt reports pleuritic right sided pain. Echo reveals normal RV without evidence of clot or strain. Plan monitor and treat pain, transition to Eliquis tonight, and consider d/c in am if stable. D/W Dr. Vazquez.

## 2023-02-03 NOTE — H&P ADULT - HISTORY OF PRESENT ILLNESS
62 M with Hx lumbar radiculopathy, L peripheral vision loss, presented to ER last night for R sided chest pain which started after dinner. Patient reports watching TV when he suddenly started to experience pain to R side of chest, pain exacerbated with deep inhalation and changes in position. He also reported feeling feverish, with temp 99F checked with thermometer, for which he took tylenol. No other accompanying symptoms reported. No recent trauma/travel/ prolonged immobilization. He reports lower back pain with radiation to R hip and thigh for which he sees ortho, recent Xray with some abnormality, however, MRI of thigh was negative. Pending MRI lower back. He reported going for a deep tissue massage on Monday. No personal or family hx VTE.     In ER, Vitals: BP: 151/96, T: 98.6F, RR: 18/min, HR: 91/min, CT Angio chest: Filling defects identified within right lobar artery extending to right lower lobe proximal segmental as well as right middle lobe segmental to subsegmental branches, started on hepatin gtt.

## 2023-02-03 NOTE — ED PROVIDER NOTE - NS ED ROS FT
Constitutional: No fever or chills  Eyes: No visual changes  HEENT: No throat pain  CV: + right sided chest pain  Resp: No SOB no cough  GI: No abd pain, nausea or vomiting  : No dysuria  MSK: No musculoskeletal pain  Skin: No rash  Neuro: No headache

## 2023-02-03 NOTE — ED PROVIDER NOTE - PHYSICAL EXAMINATION
***GEN - NAD; well appearing; A+O x3 ***HEAD - NC/AT ***EYES/NOSE - PERRL, EOMI, mucous membranes moist, no discharge ***THROAT: Oral cavity and pharynx normal. No inflammation, swelling, exudate, or lesions.  ***NECK: Neck supple, non-tender  ***PULMONARY - CTA b/l, symmetric breath sounds. ***CARDIAC -s1s2, RRR, no M,G,R  ***ABDOMEN - +BS, ND, NT, soft, no guarding, no rebound, no masses   ***BACK - no CVA tenderness, Normal  spine ***EXTREMITIES - symmetric pulses, 2+ dp, capillary refill < 2 seconds ***SKIN - no rash or bruising   ***NEUROLOGIC - alert, CN 2-12 intact

## 2023-02-03 NOTE — ED ADULT NURSE REASSESSMENT NOTE - NS ED NURSE REASSESS COMMENT FT1
REPORT GIVEN TO MARGARITO SANCHEZ. UPDATED ON PT STATUS AND POC. HEPARIN DRIP VERIFIED. PT LYING IN BED COMFORTABLY. VSS. PIV PATENT AND INTACT. NURSE WILL CONTINUE CARE.

## 2023-02-03 NOTE — ED PROVIDER NOTE - OBJECTIVE STATEMENT
Abdomen, soft, nontender, nondistended, no guarding or rigidity, no masses palpable, normal bowel sounds, Liver and Spleen, no hepatomegaly present, no hepatosplenomegaly, liver nontender, spleen not palpable 62-year-old male with no significant past medical history presents with acute onset of right-sided chest pain.  Patient states he was watching TV when he developed right-sided pain.  Pain is worse when he takes a deep breath in.  No real worsening with movement or position.  Denies any shortness of breath or trauma/injury.  No recent travel.  Patient states that he has been having some pain of his hip and thigh.  Saw an orthopedic surgeon who did an x-ray and then noted some abnormality and what appeared to be the soft tissues so was sending him for an MRI which he has not yet obtained.  He went for a massage on Monday.  No cough or shortness of breath.  Pain does not radiate

## 2023-02-03 NOTE — PHARMACOTHERAPY INTERVENTION NOTE - COMMENTS
Medication reconciliation completed.  Reviewed Medication list and confirmed med allergies with patient; confirmed with Dr. Diaz MedHx.  Pt confirms that he recently started Turmeric OTC and states that his symptoms started at the same time.

## 2023-02-03 NOTE — ED ADULT NURSE NOTE - OBJECTIVE STATEMENT
Patient presented to the ED c/o right sided chest/rib pain. Patient reports pain was sudden onset, sharp, 8 out of 10 pain. Patient denies feeling sob, heart palipitations, n/v/d, dizziness, abd pain. Pt denies pmhx, cardiac hx. Patient breathing even and unlabored.

## 2023-02-03 NOTE — H&P ADULT - TIME BILLING
I spent a total of 70 minutes on the date of this encounter coordinating the patient's care. This includes reviewing prior documentation, results and imaging in addition to completing a full history and physical examination on the patient. Further tests, medications, and procedures have been ordered as indicated. Laboratory results and the plan of care were communicated to the patient and/or their family member. Supporting documentation was completed and added to the patient's chart.

## 2023-02-03 NOTE — H&P ADULT - ASSESSMENT
62 M with Hx lumbar radiculopathy, L peripheral vision loss admitted for pulmonary embolism.     #Acute Pulmonary Embolism  #R sided pleuritic chest pain   -Admit to tele   -CT angio: Filling defects identified within right lobar artery extending to right lower lobe proximal segmental as well as right middle lobe segmental to subsegmental branches, Mild cardiomegaly with enlargement the right cardiac chamber  -TTE ordered, pr reports recent Echo with his cardiologist which was normal   -Currently saturating well on ambient air  -Pro-BNP unremarkable, Trop negative   -Monitor vitals closely  -Continue heparin drip for anticoagulation  -Pain management as per orders   -Will switch to DOAC once pt continues stable/ TTE results available   -Cardiology consult: Mild cardiomegaly with enlargement the right cardiac chamber, pending echo for eval R heart strain     #Lumbar radiculopathy   -Pain management as per orders   -PT eval  -May continue work-up as outpt unless worsening     #Glaucoma   -Continue brimonidine, latanoprost     #VTE ppx  -On heparin gtt     #Advanced directives   -Full Code              62 M with Hx lumbar radiculopathy, L peripheral vision loss admitted for pulmonary embolism.     #Acute Pulmonary Embolism  #R sided pleuritic chest pain   -Admit to tele   -CT angio: Filling defects identified within right lobar artery extending to right lower lobe proximal segmental as well as right middle lobe segmental to subsegmental branches, Mild cardiomegaly with enlargement the right cardiac chamber  -TTE ordered, pr reports recent Echo with his cardiologist which was normal   -Currently saturating well on ambient air  -Pro-BNP unremarkable, Trop negative   -Monitor vitals closely  -Continue heparin drip for anticoagulation  -Pain management as per orders   -Will switch to DOAC once pt continues stable/ TTE results available   -Cardiology consult: Mild cardiomegaly with enlargement the right cardiac chamber, pending echo for eval R heart strain     #Leukocytosis   -Likely secondary to Pulm embolism   -Continue to monitor     #Hyperglycemia  -F/u A1C    #Lumbar radiculopathy   -Pain management as per orders   -PT eval  -May continue work-up as outpt unless worsening     #Glaucoma   -Continue brimonidine, latanoprost     #VTE ppx  -On heparin gtt     #Advanced directives   -Full Code

## 2023-02-04 ENCOUNTER — TRANSCRIPTION ENCOUNTER (OUTPATIENT)
Age: 63
End: 2023-02-04

## 2023-02-04 LAB
A1C WITH ESTIMATED AVERAGE GLUCOSE RESULT: 5.7 % — HIGH (ref 4–5.6)
ALBUMIN SERPL ELPH-MCNC: 2.8 G/DL — LOW (ref 3.3–5)
ALP SERPL-CCNC: 58 U/L — SIGNIFICANT CHANGE UP (ref 40–120)
ALT FLD-CCNC: 23 U/L — SIGNIFICANT CHANGE UP (ref 12–78)
ANION GAP SERPL CALC-SCNC: 4 MMOL/L — LOW (ref 5–17)
AST SERPL-CCNC: 11 U/L — LOW (ref 15–37)
BILIRUB SERPL-MCNC: 2.5 MG/DL — HIGH (ref 0.2–1.2)
BUN SERPL-MCNC: 16 MG/DL — SIGNIFICANT CHANGE UP (ref 7–23)
CALCIUM SERPL-MCNC: 8.9 MG/DL — SIGNIFICANT CHANGE UP (ref 8.5–10.1)
CHLORIDE SERPL-SCNC: 103 MMOL/L — SIGNIFICANT CHANGE UP (ref 96–108)
CO2 SERPL-SCNC: 28 MMOL/L — SIGNIFICANT CHANGE UP (ref 22–31)
CREAT SERPL-MCNC: 1.14 MG/DL — SIGNIFICANT CHANGE UP (ref 0.5–1.3)
EGFR: 72 ML/MIN/1.73M2 — SIGNIFICANT CHANGE UP
ESTIMATED AVERAGE GLUCOSE: 117 MG/DL — HIGH (ref 68–114)
GLUCOSE SERPL-MCNC: 118 MG/DL — HIGH (ref 70–99)
HCT VFR BLD CALC: 43.8 % — SIGNIFICANT CHANGE UP (ref 39–50)
HCV AB S/CO SERPL IA: 0.12 S/CO — SIGNIFICANT CHANGE UP (ref 0–0.99)
HCV AB SERPL-IMP: SIGNIFICANT CHANGE UP
HGB BLD-MCNC: 14.9 G/DL — SIGNIFICANT CHANGE UP (ref 13–17)
MCHC RBC-ENTMCNC: 30.1 PG — SIGNIFICANT CHANGE UP (ref 27–34)
MCHC RBC-ENTMCNC: 34 GM/DL — SIGNIFICANT CHANGE UP (ref 32–36)
MCV RBC AUTO: 88.5 FL — SIGNIFICANT CHANGE UP (ref 80–100)
PLATELET # BLD AUTO: 227 K/UL — SIGNIFICANT CHANGE UP (ref 150–400)
POTASSIUM SERPL-MCNC: 3.7 MMOL/L — SIGNIFICANT CHANGE UP (ref 3.5–5.3)
POTASSIUM SERPL-SCNC: 3.7 MMOL/L — SIGNIFICANT CHANGE UP (ref 3.5–5.3)
PROT SERPL-MCNC: 6.6 GM/DL — SIGNIFICANT CHANGE UP (ref 6–8.3)
RBC # BLD: 4.95 M/UL — SIGNIFICANT CHANGE UP (ref 4.2–5.8)
RBC # FLD: 13.3 % — SIGNIFICANT CHANGE UP (ref 10.3–14.5)
SODIUM SERPL-SCNC: 135 MMOL/L — SIGNIFICANT CHANGE UP (ref 135–145)
WBC # BLD: 13.19 K/UL — HIGH (ref 3.8–10.5)
WBC # FLD AUTO: 13.19 K/UL — HIGH (ref 3.8–10.5)

## 2023-02-04 PROCEDURE — 99233 SBSQ HOSP IP/OBS HIGH 50: CPT

## 2023-02-04 RX ORDER — APIXABAN 2.5 MG/1
2 TABLET, FILM COATED ORAL
Qty: 28 | Refills: 0
Start: 2023-02-04 | End: 2023-02-10

## 2023-02-04 RX ORDER — TRAMADOL HYDROCHLORIDE 50 MG/1
1 TABLET ORAL
Qty: 9 | Refills: 0
Start: 2023-02-04 | End: 2023-02-06

## 2023-02-04 RX ORDER — APIXABAN 2.5 MG/1
1 TABLET, FILM COATED ORAL
Qty: 60 | Refills: 0
Start: 2023-02-04 | End: 2023-03-05

## 2023-02-04 RX ORDER — CYCLOBENZAPRINE HYDROCHLORIDE 10 MG/1
1 TABLET, FILM COATED ORAL
Qty: 9 | Refills: 0
Start: 2023-02-04 | End: 2023-02-06

## 2023-02-04 RX ADMIN — BRIMONIDINE TARTRATE 1 DROP(S): 2 SOLUTION/ DROPS OPHTHALMIC at 21:53

## 2023-02-04 RX ADMIN — APIXABAN 10 MILLIGRAM(S): 2.5 TABLET, FILM COATED ORAL at 21:53

## 2023-02-04 RX ADMIN — BRIMONIDINE TARTRATE 1 DROP(S): 2 SOLUTION/ DROPS OPHTHALMIC at 09:59

## 2023-02-04 RX ADMIN — APIXABAN 10 MILLIGRAM(S): 2.5 TABLET, FILM COATED ORAL at 09:59

## 2023-02-04 NOTE — DISCHARGE NOTE PROVIDER - CARE PROVIDER_API CALL
Elisabeth Fatima  HEMATOLOGY  [address], [place] [address]  Phone: [phone]  Fax: [phone]  Follow Up Time:

## 2023-02-04 NOTE — PHYSICAL THERAPY INITIAL EVALUATION ADULT - SITTING BALANCE: DYNAMIC
----- Message from Anastasiia Grissom sent at 7/18/2018 10:58 AM CDT -----  Contact: Sunil [phone]  Prescription Request:     Name of medication: lovastatin (MEVACOR) 20 MG tablet  Qty 90    Reason for request: Refill    Pharmacy: [facility] [address] [place] [address]    Please advise.    Thank You     good balance

## 2023-02-04 NOTE — DISCHARGE NOTE PROVIDER - HOSPITAL COURSE
62 M with Hx lumbar radiculopathy, L peripheral vision loss, presented to ER last night for R sided chest pain which started after dinner. Patient reports watching TV when he suddenly started to experience pain to R side of chest, pain exacerbated with deep inhalation and changes in position. He also reported feeling feverish, with temp 99F checked with thermometer, for which he took tylenol. No other accompanying symptoms reported. No recent trauma/travel/ prolonged immobilization. He reports lower back pain with radiation to R hip and thigh for which he sees ortho, recent Xray with some abnormality, however, MRI of thigh was negative. Pending MRI lower back. He reported going for a deep tissue massage on Monday. No personal or family hx VTE.     In ER, Vitals: BP: 151/96, T: 98.6F, RR: 18/min, HR: 91/min, CT Angio chest: Filling defects identified within right lobar artery extending to right lower lobe proximal segmental as well as right middle lobe segmental to subsegmental branches, started on hepatin gtt.  (03 Feb 2023 08:47)      Vital Signs Last 24 Hrs  T(C): 36.3 (04 Feb 2023 07:40), Max: 37.7 (03 Feb 2023 19:15)  T(F): 97.4 (04 Feb 2023 07:40), Max: 99.8 (03 Feb 2023 19:15)  HR: 76 (04 Feb 2023 07:40) (76 - 103)  BP: 151/84 (04 Feb 2023 07:40) (134/79 - 151/84)  BP(mean): --  RR: 18 (04 Feb 2023 07:40) (17 - 18)  SpO2: 94% (04 Feb 2023 07:40) (93% - 94%)      PHYSICAL EXAM:    Constitutional: NAD, awake and alert,   HEENT: PERR, EOMI, Normal Hearing, MMM  Neck: Soft and supple, No LAD, No JVD  Respiratory: Breath sounds are clear bilaterally, No wheezing, rales or rhonchi  Cardiovascular: S1 and S2, regular rate and rhythm, no Murmurs, gallops or rubs  Gastrointestinal: Bowel Sounds present, soft, nontender, nondistended, no guarding, no rebound  Extremities: No peripheral edema  Vascular: 2+ peripheral pulses  Neurological: A/O x 3, no focal deficits  Musculoskeletal: 5/5 strength b/l upper and lower extremities  Skin: No rashes      LABS: All Labs Reviewed:    sessment:  · Assessment    62 M with Hx lumbar radiculopathy, L peripheral vision loss admitted for pulmonary embolism.     #Acute Pulmonary Embolism  #R sided pleuritic chest pain   - no tele events  -CT angio: Filling defects identified within right lobar artery extending to right lower lobe proximal segmental as well as right middle lobe segmental to subsegmental branches, Mild cardiomegaly with enlargement the right cardiac chamber  -TTE : no rv strain  -Currently saturating well on ambient air 95% on RA  -Pro-BNP unremarkable, Trop negative   -Monitor vitals closely  -eliquis 10bid for 7 days then 5 bid there after. D/W son who is a nurse  -Pain management as per orders   -heme eval as o/p        #Lumbar radiculopathy   -Pain management as per orders   -PT eval  -May continue work-up as outpt unless worsening     #Glaucoma   -Continue brimonidine, latanoprost       #Advanced directives   -Full Code     dc time: 59 min               62 M with Hx lumbar radiculopathy, L peripheral vision loss, presented to ER last night for R sided chest pain which started after dinner. Patient reports watching TV when he suddenly started to experience pain to R side of chest, pain exacerbated with deep inhalation and changes in position. He also reported feeling feverish, with temp 99F checked with thermometer, for which he took tylenol. No other accompanying symptoms reported. No recent trauma/travel/ prolonged immobilization. He reports lower back pain with radiation to R hip and thigh for which he sees ortho, recent Xray with some abnormality, however, MRI of thigh was negative. Pending MRI lower back. He reported going for a deep tissue massage on Monday. No personal or family hx VTE.     In ER, Vitals: BP: 151/96, T: 98.6F, RR: 18/min, HR: 91/min, CT Angio chest: Filling defects identified within right lobar artery extending to right lower lobe proximal segmental as well as right middle lobe segmental to subsegmental branches, started on hepatin gtt.  (03 Feb 2023 08:47)    ICU Vital Signs Last 24 Hrs  T(C): 37.1 (05 Feb 2023 07:32), Max: 37.3 (04 Feb 2023 19:52)  T(F): 98.8 (05 Feb 2023 07:32), Max: 99.2 (04 Feb 2023 19:52)  HR: 75 (05 Feb 2023 07:32) (75 - 88)  BP: 153/78 (05 Feb 2023 07:32) (143/71 - 153/78)  BP(mean): --  ABP: --  ABP(mean): --  RR: 19 (05 Feb 2023 07:32) (19 - 19)  SpO2: 96% (05 Feb 2023 07:32) (94% - 96%)    O2 Parameters below as of 05 Feb 2023 07:32  Patient On (Oxygen Delivery Method): room air            PHYSICAL EXAM:    Constitutional: NAD, awake and alert,   HEENT: PERR, EOMI, Normal Hearing, MMM  Neck: Soft and supple, No LAD, No JVD  Respiratory: Breath sounds are clear bilaterally, No wheezing, rales or rhonchi  Cardiovascular: S1 and S2, regular rate and rhythm, no Murmurs, gallops or rubs  Gastrointestinal: Bowel Sounds present, soft, nontender, nondistended, no guarding, no rebound  Extremities: No peripheral edema  Vascular: 2+ peripheral pulses  Neurological: A/O x 3, no focal deficits  Musculoskeletal: 5/5 strength b/l upper and lower extremities  Skin: No rashes      LABS: All Labs Reviewed:      · Assessment    62 M with Hx lumbar radiculopathy, L peripheral vision loss admitted for pulmonary embolism.     #Acute Pulmonary Embolism  #R sided pleuritic chest pain     - CT angio: Filling defects identified within right lobar artery extending to right lower lobe proximal segmental as well as right middle lobe segmental to subsegmental branches, Mild cardiomegaly with enlargement the right   cardiac chamber  - TTE : no rv strain  - Currently saturating well on ambient air 95% on RA  - Pro-BNP unremarkable, Trop negative   - Monitor vitals closely  - Eliquis was NOT covered by his insurance-> sent over Rx for Xarelto and was approved. He confirmed that family was able to  meds  - he understands the risks and benefits of doac and has been advised to come to ED if any overt bleeding  - He is to f/u with his pcp in 2 weeks (wants clearance for exercise and travelling) Advised not to travel or start strenuous activity until he is formally cleared b y his pcp  - hematology as o/p for hypercoaguable w/u      #Lumbar radiculopathy   -Pain management as per orders   -PT eval  -May continue work-up as outpt unless worsening     #Glaucoma   -Continue brimonidine, latanoprost       #Advanced directives   -Full Code     dc time: 59 min

## 2023-02-04 NOTE — PHYSICAL THERAPY INITIAL EVALUATION ADULT - GENERAL OBSERVATIONS, REHAB EVAL
Pt seen for 45min PT Eval. Pt rec'd semi supine in bed in NAD. Pt indep in bed mobility, requires SBA for OOB 2/2 pain limp from previous sciatic pain, gait improves with SC. Pt with no acute care PT needs, would benefit from outpatient PT. pt left semi supine all needs met, RN aware.

## 2023-02-04 NOTE — DISCHARGE NOTE PROVIDER - NSDCMRMEDTOKEN_GEN_ALL_CORE_FT
apixaban 5 mg oral tablet: 2 tab(s) orally 2 times a day until 2/10/23   apixaban 5 mg oral tablet: 1 tab(s) orally 2 times a day start on 2/11/23  brimonidine 0.2% ophthalmic solution: 1 drop(s) in each eye 2 times a day  cyclobenzaprine 10 mg oral tablet: 1 tab(s) orally 3 times a day, As needed, Muscle Spasm  latanoprost 0.005% ophthalmic solution: 1 drop(s) in each eye once a day (in the evening)  traMADol 50 mg oral tablet: 1 tab(s) orally every 8 hours, As Needed MDD:3   brimonidine 0.2% ophthalmic solution: 1 drop(s) in each eye 2 times a day  cyclobenzaprine 10 mg oral tablet: 1 tab(s) orally 3 times a day, As needed, Muscle Spasm  latanoprost 0.005% ophthalmic solution: 1 drop(s) in each eye once a day (in the evening)  rivaroxaban 15 mg oral tablet: 1 tab(s) orally 2 times a day until 2/25/23  rivaroxaban 20 mg oral tablet: 1 tab(s) orally once a day BEGIN on 2/26/23  traMADol 50 mg oral tablet: 1 tab(s) orally every 8 hours, As Needed MDD:3

## 2023-02-04 NOTE — PHYSICAL THERAPY INITIAL EVALUATION ADULT - PERTINENT HX OF CURRENT PROBLEM, REHAB EVAL
62 M with Hx lumbar radiculopathy, L peripheral vision loss admitted for pulmonary embolism.     CTA: Filling defects identified within right lobar artery extending to right lower lobe proximal segmental as well as right middle lobe segmental to subsegmental branches, compatible with pulmonary embolism. Mild cardiomegaly with enlargement the right cardiac chamber. Consider correlation with echocardiogram to exclude right heart strain in appropriate clinical setting. Mild bibasilar dependent atelectasis. Suggestion of groundglass attenuation in right middle lobe.

## 2023-02-04 NOTE — DISCHARGE NOTE PROVIDER - CARE PROVIDERS DIRECT ADDRESSES
[email or web address]

## 2023-02-05 ENCOUNTER — TRANSCRIPTION ENCOUNTER (OUTPATIENT)
Age: 63
End: 2023-02-05

## 2023-02-05 VITALS
TEMPERATURE: 99 F | OXYGEN SATURATION: 96 % | DIASTOLIC BLOOD PRESSURE: 78 MMHG | SYSTOLIC BLOOD PRESSURE: 153 MMHG | HEART RATE: 75 BPM | RESPIRATION RATE: 19 BRPM

## 2023-02-05 LAB
HCT VFR BLD CALC: 43.5 % — SIGNIFICANT CHANGE UP (ref 39–50)
HGB BLD-MCNC: 14.8 G/DL — SIGNIFICANT CHANGE UP (ref 13–17)
MCHC RBC-ENTMCNC: 29.8 PG — SIGNIFICANT CHANGE UP (ref 27–34)
MCHC RBC-ENTMCNC: 34 GM/DL — SIGNIFICANT CHANGE UP (ref 32–36)
MCV RBC AUTO: 87.5 FL — SIGNIFICANT CHANGE UP (ref 80–100)
PLATELET # BLD AUTO: 236 K/UL — SIGNIFICANT CHANGE UP (ref 150–400)
RBC # BLD: 4.97 M/UL — SIGNIFICANT CHANGE UP (ref 4.2–5.8)
RBC # FLD: 13.2 % — SIGNIFICANT CHANGE UP (ref 10.3–14.5)
WBC # BLD: 12.37 K/UL — HIGH (ref 3.8–10.5)
WBC # FLD AUTO: 12.37 K/UL — HIGH (ref 3.8–10.5)

## 2023-02-05 PROCEDURE — 99233 SBSQ HOSP IP/OBS HIGH 50: CPT

## 2023-02-05 PROCEDURE — 99239 HOSP IP/OBS DSCHRG MGMT >30: CPT

## 2023-02-05 RX ORDER — RIVAROXABAN 15 MG-20MG
1 KIT ORAL
Qty: 30 | Refills: 0
Start: 2023-02-05

## 2023-02-05 RX ORDER — RIVAROXABAN 15 MG-20MG
1 KIT ORAL
Qty: 42 | Refills: 0
Start: 2023-02-05 | End: 2023-02-25

## 2023-02-05 RX ADMIN — BRIMONIDINE TARTRATE 1 DROP(S): 2 SOLUTION/ DROPS OPHTHALMIC at 09:54

## 2023-02-05 RX ADMIN — APIXABAN 10 MILLIGRAM(S): 2.5 TABLET, FILM COATED ORAL at 09:53

## 2023-02-05 NOTE — DISCHARGE NOTE NURSING/CASE MANAGEMENT/SOCIAL WORK - PATIENT PORTAL LINK FT
You can access the FollowMyHealth Patient Portal offered by [facility] by registering at the following website: [email or web address]. By joining [facility]’s FollowMyHealth portal, you will also be able to view your health information using other applications (apps) compatible with our system.

## 2023-02-05 NOTE — PROGRESS NOTE ADULT - SUBJECTIVE AND OBJECTIVE BOX
PCP:    REQUESTING PHYSICIAN:    REASON FOR CONSULT:    CHIEF COMPLAINT:    HPI:  62 M with Hx lumbar radiculopathy, L peripheral vision loss, presented to ER last night for R sided chest pain which started after dinner. Patient reports watching TV when he suddenly started to experience pain to R side of chest, pain exacerbated with deep inhalation and changes in position. He also reported feeling feverish, with temp 99F checked with thermometer, for which he took tylenol. No other accompanying symptoms reported. No recent trauma/travel/ prolonged immobilization. He reports lower back pain with radiation to R hip and thigh for which he sees ortho, recent Xray with some abnormality, however, MRI of thigh was negative. Pending MRI lower back. He reported going for a deep tissue massage on Monday. No personal or family hx VTE.     In ER, Vitals: BP: 151/96, T: 98.6F, RR: 18/min, HR: 91/min, CT Angio chest: Filling defects identified within right lobar artery extending to right lower lobe proximal segmental as well as right middle lobe segmental to subsegmental branches, started on hepatin gtt.  (03 Feb 2023 08:47). Cardiology called to evaluate PE and R sided strain. Pt denies exertional symptoms prior to this admission. He denies history of CV disease. Echo and labs were evaluated. Dtr at bedside.  2/4/23: Pt continues to experience pleuritic chest pain but improved.         PAST MEDICAL & SURGICAL HISTORY:  No pertinent past medical history      No significant past surgical history          SOCIAL HISTORY:    FAMILY HISTORY:      ALLERGIES:  Allergies    No Known Allergies    Intolerances        MEDICATIONS:    MEDICATIONS  (STANDING):  apixaban 10 milliGRAM(s) Oral <User Schedule>  brimonidine 0.2% Ophthalmic Solution 1 Drop(s) Both EYES two times a day  latanoprost 0.005% Ophthalmic Solution 1 Drop(s) Both EYES at bedtime    MEDICATIONS  (PRN):  acetaminophen     Tablet .. 650 milliGRAM(s) Oral every 6 hours PRN Temp greater or equal to 38C (100.4F), Mild Pain (1 - 3)  aluminum hydroxide/magnesium hydroxide/simethicone Suspension 30 milliLiter(s) Oral every 4 hours PRN Dyspepsia  cyclobenzaprine 10 milliGRAM(s) Oral three times a day PRN Muscle Spasm  melatonin 3 milliGRAM(s) Oral at bedtime PRN Insomnia  ondansetron Injectable 4 milliGRAM(s) IV Push every 8 hours PRN Nausea and/or Vomiting  oxycodone    5 mG/acetaminophen 325 mG 2 Tablet(s) Oral every 4 hours PRN Severe Pain (7 - 10)  oxycodone    5 mG/acetaminophen 325 mG 1 Tablet(s) Oral every 6 hours PRN Moderate Pain (4 - 6)        Vital Signs Last 24 Hrs  T(C): 36.3 (04 Feb 2023 07:40), Max: 37.7 (03 Feb 2023 19:15)  T(F): 97.4 (04 Feb 2023 07:40), Max: 99.8 (03 Feb 2023 19:15)  HR: 76 (04 Feb 2023 07:40) (76 - 103)  BP: 151/84 (04 Feb 2023 07:40) (134/79 - 151/84)  BP(mean): --  RR: 18 (04 Feb 2023 07:40) (17 - 18)  SpO2: 94% (04 Feb 2023 07:40) (93% - 94%)    Parameters below as of 04 Feb 2023 07:40  Patient On (Oxygen Delivery Method): room air    Daily     Daily I&O's Summary      PHYSICAL EXAM:    Constitutional: NAD, awake and alert, well-developed  HEENT: PERR, EOMI,  No oral cyananosis.  Neck:  supple,  No JVD  Respiratory: Breath sounds are clear bilaterally, No wheezing, rales or rhonchi  Cardiovascular: S1 and S2, regular rate and rhythm, no Murmurs, gallops or rubs  Gastrointestinal: Bowel Sounds present, soft, nontender.   Extremities: No peripheral edema. No clubbing or cyanosis.  Vascular: 2+ peripheral pulses  Neurological: A/O x 3, no focal deficits  Musculoskeletal: no calf tenderness.  Skin: No rashes.      LABS: All Labs Reviewed:                        14.9   13.19 )-----------( 227      ( 04 Feb 2023 06:30 )             43.8                         15.2   11.96 )-----------( 235      ( 03 Feb 2023 08:00 )             46.3                         16.1   13.71 )-----------( 263      ( 03 Feb 2023 00:05 )             47.1     04 Feb 2023 06:30    135    |  103    |  16     ----------------------------<  118    3.7     |  28     |  1.14   03 Feb 2023 08:00    138    |  103    |  17     ----------------------------<  117    3.7     |  30     |  1.21   03 Feb 2023 00:05    137    |  103    |  19     ----------------------------<  137    3.5     |  29     |  1.23     Ca    8.9        04 Feb 2023 06:30  Ca    9.2        03 Feb 2023 08:00  Ca    8.9        03 Feb 2023 00:05    TPro  6.6    /  Alb  2.8    /  TBili  2.5    /  DBili  x      /  AST  11     /  ALT  23     /  AlkPhos  58     04 Feb 2023 06:30  TPro  7.1    /  Alb  3.3    /  TBili  1.4    /  DBili  x      /  AST  15     /  ALT  31     /  AlkPhos  72     03 Feb 2023 00:05    PT/INR - ( 03 Feb 2023 00:05 )   PT: 12.7 sec;   INR: 1.09 ratio         PTT - ( 03 Feb 2023 18:48 )  PTT:44.3 sec      Blood Culture:   02-03 @ 00:05  Pro Bnp 80        RADIOLOGY/EKG:< from: 12 Lead ECG (02.03.23 @ 00:05) >    Diagnosis Line Normal sinus rhythm  Left anterior fascicular block  Moderate voltage criteria for LVH, may be normal variant  Cannot rule out Septal infarct , age undetermined  Abnormal ECG  No previous ECGs available  Confirmed by Naman Hackett MD ([id number]) on 2/3/2023 9:25:04 AM    < end of copied text >        ECHO/CARDIAC CATHTERIZATION/STRESS TEST:  < from: TTE Echo Complete w/o Contrast w/ Doppler (02.03.23 @ 08:34) >  Summary     The left ventricle is normal in size, wall thickness, wall motion and   contractility.   Estimated left ventricular ejection fraction is 55-60 %.   The right ventricle is normal in size, wall thickness, wall motion, and   contractility based on TAPSE and tissue doppler.   Normal aortic valve structure.   Mild (1+) aortic regurgitation is present.   Normal appearing tricuspid valve structure and function.   Trace tricuspid valve regurgitation is present.     Signature     ----------------------------------------------------------------   Electronically signed by Eagle Horowitz MD(Interpreting   physician) on 02/03/2023 09:39 AM    < end of copied text >  
PCP:    REQUESTING PHYSICIAN:    REASON FOR CONSULT:    CHIEF COMPLAINT:    HPI:  62 M with Hx lumbar radiculopathy, L peripheral vision loss, presented to ER last night for R sided chest pain which started after dinner. Patient reports watching TV when he suddenly started to experience pain to R side of chest, pain exacerbated with deep inhalation and changes in position. He also reported feeling feverish, with temp 99F checked with thermometer, for which he took tylenol. No other accompanying symptoms reported. No recent trauma/travel/ prolonged immobilization. He reports lower back pain with radiation to R hip and thigh for which he sees ortho, recent Xray with some abnormality, however, MRI of thigh was negative. Pending MRI lower back. He reported going for a deep tissue massage on Monday. No personal or family hx VTE.   In ER, Vitals: BP: 151/96, T: 98.6F, RR: 18/min, HR: 91/min, CT Angio chest: Filling defects identified within right lobar artery extending to right lower lobe proximal segmental as well as right middle lobe segmental to subsegmental branches, started on hepatin gtt.  (03 Feb 2023 08:47). Cardiology called to evaluate PE and R sided strain. Pt denies exertional symptoms prior to this admission. He denies history of CV disease. Echo and labs were evaluated. Dtr at bedside.    2/5/23: pt. with no complaints, Tele: NSR 70s, on AC - xarelto (eliquis not covered); Echo =- preserved EF, normal,  stable for discharge from cardiology stand point       MEDICATIONS:    MEDICATIONS  (STANDING):  apixaban 10 milliGRAM(s) Oral <User Schedule>  brimonidine 0.2% Ophthalmic Solution 1 Drop(s) Both EYES two times a day  latanoprost 0.005% Ophthalmic Solution 1 Drop(s) Both EYES at bedtime      MEDICATIONS  (PRN):  acetaminophen     Tablet .. 650 milliGRAM(s) Oral every 6 hours PRN Temp greater or equal to 38C (100.4F), Mild Pain (1 - 3)  aluminum hydroxide/magnesium hydroxide/simethicone Suspension 30 milliLiter(s) Oral every 4 hours PRN Dyspepsia  cyclobenzaprine 10 milliGRAM(s) Oral three times a day PRN Muscle Spasm  heparin   Injectable 6500 Unit(s) IV Push every 6 hours PRN For aPTT less than 40  heparin   Injectable 3000 Unit(s) IV Push every 6 hours PRN For aPTT between 40 - 57  melatonin 3 milliGRAM(s) Oral at bedtime PRN Insomnia  ondansetron Injectable 4 milliGRAM(s) IV Push every 8 hours PRN Nausea and/or Vomiting  oxycodone    5 mG/acetaminophen 325 mG 2 Tablet(s) Oral every 4 hours PRN Severe Pain (7 - 10)  oxycodone    5 mG/acetaminophen 325 mG 1 Tablet(s) Oral every 6 hours PRN Moderate Pain (4 - 6)    Vital Signs Last 24 Hrs  T(C): 37.1 (05 Feb 2023 07:32), Max: 37.3 (04 Feb 2023 19:52)  T(F): 98.8 (05 Feb 2023 07:32), Max: 99.2 (04 Feb 2023 19:52)  HR: 75 (05 Feb 2023 07:32) (75 - 88)  BP: 153/78 (05 Feb 2023 07:32) (143/71 - 153/78)  BP(mean): --  RR: 19 (05 Feb 2023 07:32) (19 - 19)  SpO2: 96% (05 Feb 2023 07:32) (89% - 96%)    Parameters below as of 05 Feb 2023 07:32  Patient On (Oxygen Delivery Method): room air    PHYSICAL EXAM:    Constitutional: NAD, awake and alert, well-developed  HEENT: PERR, EOMI,  No oral cyananosis.  Neck:  supple,  No JVD  Respiratory: Breath sounds are clear bilaterally, No wheezing, rales or rhonchi  Cardiovascular: S1 and S2, regular rate and rhythm, no Murmurs, gallops or rubs  Gastrointestinal: Bowel Sounds present, soft, nontender.   Extremities: No peripheral edema. No clubbing or cyanosis.  Vascular: 2+ peripheral pulses  Neurological: A/O x 3, no focal deficits  Musculoskeletal: no calf tenderness.  Skin: No rashes.      LABS: All Labs Reviewed:                        15.2   11.96 )-----------( 235      ( 03 Feb 2023 08:00 )             46.3                         16.1   13.71 )-----------( 263      ( 03 Feb 2023 00:05 )             47.1     03 Feb 2023 08:00    138    |  103    |  17     ----------------------------<  117    3.7     |  30     |  1.21   03 Feb 2023 00:05    137    |  103    |  19     ----------------------------<  137    3.5     |  29     |  1.23     Ca    9.2        03 Feb 2023 08:00  Ca    8.9        03 Feb 2023 00:05    TPro  7.1    /  Alb  3.3    /  TBili  1.4    /  DBili  x      /  AST  15     /  ALT  31     /  AlkPhos  72     03 Feb 2023 00:05    PT/INR - ( 03 Feb 2023 00:05 )   PT: 12.7 sec;   INR: 1.09 ratio         PTT - ( 03 Feb 2023 00:05 )  PTT:29.5 sec      Blood Culture:   02-03 @ 00:05  Pro Bnp 80        RADIOLOGY/EKG:< from: 12 Lead ECG (02.03.23 @ 00:05) >    Diagnosis Line Normal sinus rhythm  Left anterior fascicular block  Moderate voltage criteria for LVH, may be normal variant  Cannot rule out Septal infarct , age undetermined  Abnormal ECG  No previous ECGs available  Confirmed by Naman Hackett MD ([id number]) on 2/3/2023 9:25:04 AM    < end of copied text >    < from: TTE Echo Complete w/o Contrast w/ Doppler (02.03.23 @ 08:34) >  Impression     Summary     The left ventricle is normal in size, wall thickness, wall motion and   contractility.   Estimated left ventricular ejection fraction is 55-60 %.   The right ventricle is normal in size, wall thickness, wall motion, and   contractility based on TAPSE and tissue doppler.   Normal aortic valve structure.   Mild (1+) aortic regurgitation is present.   Normal appearing tricuspid valve structure and function.   Trace tricuspid valve regurgitation is present.     Signature     ----------------------------------------------------------------   Electronically signed by Eagle Horowitz MD(Interpreting   physician) on 02/03/2023 09:39 AM    < end of copied text >

## 2023-02-05 NOTE — PROGRESS NOTE ADULT - PROBLEM SELECTOR PLAN 1
pt. asymptomatic, no SOB, Echo - preserved EF, normal  on AC - will be discharged on loading dose xarelto - eliquis not covered     stable for discharge from cardiology standpoint, signed off
Pt symptomatically improved. Continue Eliquis. Stable for discharge from a cardiac point of view.

## 2023-02-06 DIAGNOSIS — M54.16 RADICULOPATHY, LUMBAR REGION: ICD-10-CM

## 2023-02-06 DIAGNOSIS — H53.452 OTHER LOCALIZED VISUAL FIELD DEFECT, LEFT EYE: ICD-10-CM

## 2023-02-07 PROBLEM — Z78.9 OTHER SPECIFIED HEALTH STATUS: Chronic | Status: ACTIVE | Noted: 2023-02-03

## 2023-02-09 DIAGNOSIS — H54.62 UNQUALIFIED VISUAL LOSS, LEFT EYE, NORMAL VISION RIGHT EYE: ICD-10-CM

## 2023-02-09 DIAGNOSIS — R73.9 HYPERGLYCEMIA, UNSPECIFIED: ICD-10-CM

## 2023-02-09 DIAGNOSIS — I51.7 CARDIOMEGALY: ICD-10-CM

## 2023-02-09 DIAGNOSIS — Z20.822 CONTACT WITH AND (SUSPECTED) EXPOSURE TO COVID-19: ICD-10-CM

## 2023-02-09 DIAGNOSIS — M54.16 RADICULOPATHY, LUMBAR REGION: ICD-10-CM

## 2023-02-09 DIAGNOSIS — D72.829 ELEVATED WHITE BLOOD CELL COUNT, UNSPECIFIED: ICD-10-CM

## 2023-02-09 DIAGNOSIS — I26.99 OTHER PULMONARY EMBOLISM WITHOUT ACUTE COR PULMONALE: ICD-10-CM

## 2023-02-09 DIAGNOSIS — H40.9 UNSPECIFIED GLAUCOMA: ICD-10-CM

## 2023-02-23 ENCOUNTER — OUTPATIENT (OUTPATIENT)
Dept: OUTPATIENT SERVICES | Facility: HOSPITAL | Age: 63
LOS: 1 days | Discharge: HOME | End: 2023-02-23

## 2023-02-23 DIAGNOSIS — H54.62 UNQUALIFIED VISUAL LOSS, LEFT EYE, NORMAL VISION RIGHT EYE: ICD-10-CM

## 2023-02-23 DIAGNOSIS — I74.9 EMBOLISM AND THROMBOSIS OF UNSPECIFIED ARTERY: ICD-10-CM

## 2023-02-23 RX ORDER — BRIMONIDINE TARTRATE 2 MG/MG
0.2 SOLUTION/ DROPS OPHTHALMIC
Refills: 0 | Status: ACTIVE | COMMUNITY

## 2023-02-28 ENCOUNTER — TRANSCRIPTION ENCOUNTER (OUTPATIENT)
Age: 63
End: 2023-02-28

## 2023-02-28 ENCOUNTER — LABORATORY RESULT (OUTPATIENT)
Age: 63
End: 2023-02-28

## 2023-02-28 ENCOUNTER — RESULT REVIEW (OUTPATIENT)
Age: 63
End: 2023-02-28

## 2023-02-28 ENCOUNTER — APPOINTMENT (OUTPATIENT)
Dept: HEMATOLOGY ONCOLOGY | Facility: CLINIC | Age: 63
End: 2023-02-28
Payer: COMMERCIAL

## 2023-02-28 ENCOUNTER — NON-APPOINTMENT (OUTPATIENT)
Age: 63
End: 2023-02-28

## 2023-02-28 VITALS
OXYGEN SATURATION: 98 % | RESPIRATION RATE: 18 BRPM | BODY MASS INDEX: 27.35 KG/M2 | HEART RATE: 79 BPM | DIASTOLIC BLOOD PRESSURE: 80 MMHG | HEIGHT: 68 IN | SYSTOLIC BLOOD PRESSURE: 155 MMHG | WEIGHT: 180.44 LBS | TEMPERATURE: 97.9 F

## 2023-02-28 LAB
BASOPHILS # BLD AUTO: 0.11 K/UL — SIGNIFICANT CHANGE UP (ref 0–0.2)
BASOPHILS NFR BLD AUTO: 1.2 % — SIGNIFICANT CHANGE UP (ref 0–2)
EOSINOPHIL # BLD AUTO: 0.58 K/UL — HIGH (ref 0–0.5)
EOSINOPHIL NFR BLD AUTO: 6.3 % — HIGH (ref 0–6)
HCT VFR BLD CALC: 47.6 % — SIGNIFICANT CHANGE UP (ref 39–50)
HGB BLD-MCNC: 15.7 G/DL — SIGNIFICANT CHANGE UP (ref 13–17)
IMM GRANULOCYTES NFR BLD AUTO: 0.3 % — SIGNIFICANT CHANGE UP (ref 0–0.9)
LYMPHOCYTES # BLD AUTO: 2.52 K/UL — SIGNIFICANT CHANGE UP (ref 1–3.3)
LYMPHOCYTES # BLD AUTO: 27.4 % — SIGNIFICANT CHANGE UP (ref 13–44)
MCHC RBC-ENTMCNC: 29.6 PG — SIGNIFICANT CHANGE UP (ref 27–34)
MCHC RBC-ENTMCNC: 33 GM/DL — SIGNIFICANT CHANGE UP (ref 32–36)
MCV RBC AUTO: 89.8 FL — SIGNIFICANT CHANGE UP (ref 80–100)
MONOCYTES # BLD AUTO: 0.62 K/UL — SIGNIFICANT CHANGE UP (ref 0–0.9)
MONOCYTES NFR BLD AUTO: 6.7 % — SIGNIFICANT CHANGE UP (ref 2–14)
NEUTROPHILS # BLD AUTO: 5.33 K/UL — SIGNIFICANT CHANGE UP (ref 1.8–7.4)
NEUTROPHILS NFR BLD AUTO: 58.1 % — SIGNIFICANT CHANGE UP (ref 43–77)
NRBC # BLD: 0 /100 WBCS — SIGNIFICANT CHANGE UP (ref 0–0)
PLATELET # BLD AUTO: 296 K/UL — SIGNIFICANT CHANGE UP (ref 150–400)
RBC # BLD: 5.3 M/UL — SIGNIFICANT CHANGE UP (ref 4.2–5.8)
RBC # FLD: 13.6 % — SIGNIFICANT CHANGE UP (ref 10.3–14.5)
WBC # BLD: 9.19 K/UL — SIGNIFICANT CHANGE UP (ref 3.8–10.5)
WBC # FLD AUTO: 9.19 K/UL — SIGNIFICANT CHANGE UP (ref 3.8–10.5)

## 2023-02-28 PROCEDURE — 99203 OFFICE O/P NEW LOW 30 MIN: CPT

## 2023-02-28 NOTE — HISTORY OF PRESENT ILLNESS
[de-identified] : LEON MEDINA is a 63 y.o. M with a PMH significant for lumbar radiculopathy, left peripheral vision loss, ANNIA, and recent PE 2/3/23, who has been referred to our office for a hypercoag evaluation. \par \par 3/2/23 - 2/5/23 - Patient admitted to  with pleuritic right chest pains.  Low grade temps. No recent trauma/travel/ prolonged immobilization. Found to have PE, no DVT's. \par \par 2/3/23 - CT Angio Chest - Filling defects identified within right lobar artery extending to RLL proximal segmental as well as RML segmental to subsegmental branches, compatible with pulmonary embolism.\par Mild cardiomegaly with enlargement the right cardiac chamber. \par 2/3/23 - B/L LE Dopplers - No evidence of DVT in either lower extremity.\par 2/3/23 - ECHO - No RV strain. \par Discharged on Xarelto.   [de-identified] : Dyspnea has completely resolved.

## 2023-02-28 NOTE — ASSESSMENT
[FreeTextEntry1] : 63-year-old male, currently on Xarelto after presenting with an unprovoked pulmonary embolism.\par Hypercoagulable panel was drawn in our office today.  CT angiogram will be repeated in May, and if clot resolution is demonstrated then anticoagulation will be discontinued.\par We will be in touch regarding results of today's hypercoagulable work-up.

## 2023-03-01 ENCOUNTER — OFFICE (OUTPATIENT)
Dept: URBAN - METROPOLITAN AREA CLINIC 115 | Facility: CLINIC | Age: 63
Setting detail: OPHTHALMOLOGY
End: 2023-03-01
Payer: COMMERCIAL

## 2023-03-01 DIAGNOSIS — H40.1132: ICD-10-CM

## 2023-03-01 DIAGNOSIS — H43.812: ICD-10-CM

## 2023-03-01 DIAGNOSIS — H53.433: ICD-10-CM

## 2023-03-01 DIAGNOSIS — H47.012: ICD-10-CM

## 2023-03-01 DIAGNOSIS — H47.212: ICD-10-CM

## 2023-03-01 DIAGNOSIS — G43.109: ICD-10-CM

## 2023-03-01 LAB
AT III ACT/NOR PPP CHRO: 124 % — SIGNIFICANT CHANGE UP (ref 85–135)
B2 GLYCOPROT1 IGA SER QL: <5 SAU — SIGNIFICANT CHANGE UP
B2 GLYCOPROT1 IGG SER-ACNC: <5 SGU — SIGNIFICANT CHANGE UP
B2 GLYCOPROT1 IGM SER-ACNC: 10.2 SMU — SIGNIFICANT CHANGE UP
CARDIOLIPIN AB SER-ACNC: NEGATIVE — SIGNIFICANT CHANGE UP
CARDIOLIPIN IGM SER-MCNC: 10.9 MPL — SIGNIFICANT CHANGE UP (ref 0–12.5)
CARDIOLIPIN IGM SER-MCNC: 5 GPL — SIGNIFICANT CHANGE UP (ref 0–12.5)
DEPRECATED CARDIOLIPIN IGA SER: <5 APL — SIGNIFICANT CHANGE UP (ref 0–12.5)
HCYS SERPL-MCNC: 12.9 UMOL/L — SIGNIFICANT CHANGE UP
PROT C ACT/NOR PPP: 112 % — SIGNIFICANT CHANGE UP (ref 74–150)
PROT S FREE AG PPP IA-ACNC: 85 % — SIGNIFICANT CHANGE UP (ref 67–141)

## 2023-03-01 PROCEDURE — 99213 OFFICE O/P EST LOW 20 MIN: CPT | Performed by: OPHTHALMOLOGY

## 2023-03-01 PROCEDURE — 92133 CPTRZD OPH DX IMG PST SGM ON: CPT | Performed by: OPHTHALMOLOGY

## 2023-03-01 PROCEDURE — 92083 EXTENDED VISUAL FIELD XM: CPT | Performed by: OPHTHALMOLOGY

## 2023-03-01 ASSESSMENT — TONOMETRY
OD_IOP_MMHG: 11
OS_IOP_MMHG: 10

## 2023-03-01 ASSESSMENT — REFRACTION_AUTOREFRACTION
OS_AXIS: 075
OS_SPHERE: -4.00
OD_AXIS: 086
OS_CYLINDER: -6.00
OD_CYLINDER: -7.00
OD_SPHERE: -2.25

## 2023-03-01 ASSESSMENT — REFRACTION_CURRENTRX
OD_CYLINDER: -6.00
OS_VPRISM_DIRECTION: PROGS
OD_VPRISM_DIRECTION: PROGS
OD_SPHERE: -3.00
OS_SPHERE: -4.75
OD_OVR_VA: 20/
OS_CYLINDER: -4.50
OD_ADD: +2.25
OD_AXIS: 077
OS_AXIS: 071
OS_OVR_VA: 20/
OS_ADD: +2.25

## 2023-03-01 ASSESSMENT — PACHYMETRY
OS_CT_CORRECTION: 1
OD_CT_UM: 533
OD_CT_CORRECTION: 1
OS_CT_UM: 530

## 2023-03-01 ASSESSMENT — SPHEQUIV_DERIVED
OS_SPHEQUIV: -7
OD_SPHEQUIV: -5.75

## 2023-03-01 ASSESSMENT — CONFRONTATIONAL VISUAL FIELD TEST (CVF)
OD_FINDINGS: FULL
OS_FINDINGS: FULL

## 2023-03-01 ASSESSMENT — VISUAL ACUITY
OD_BCVA: 20/60
OS_BCVA: 20/30-1

## 2023-03-02 DIAGNOSIS — I26.99 OTHER PULMONARY EMBOLISM WITHOUT ACUTE COR PULMONALE: ICD-10-CM

## 2023-03-07 ENCOUNTER — NON-APPOINTMENT (OUTPATIENT)
Age: 63
End: 2023-03-07

## 2023-05-21 ENCOUNTER — OUTPATIENT (OUTPATIENT)
Dept: OUTPATIENT SERVICES | Facility: HOSPITAL | Age: 63
LOS: 1 days | Discharge: HOME | End: 2023-05-21

## 2023-05-21 DIAGNOSIS — I74.9 EMBOLISM AND THROMBOSIS OF UNSPECIFIED ARTERY: ICD-10-CM

## 2023-05-22 ENCOUNTER — APPOINTMENT (OUTPATIENT)
Dept: PEDIATRIC ALLERGY IMMUNOLOGY | Facility: CLINIC | Age: 63
End: 2023-05-22

## 2023-05-30 ENCOUNTER — APPOINTMENT (OUTPATIENT)
Dept: HEMATOLOGY ONCOLOGY | Facility: CLINIC | Age: 63
End: 2023-05-30
Payer: MEDICARE

## 2023-05-30 VITALS
OXYGEN SATURATION: 98 % | WEIGHT: 180.5 LBS | DIASTOLIC BLOOD PRESSURE: 77 MMHG | RESPIRATION RATE: 18 BRPM | HEIGHT: 68 IN | HEART RATE: 52 BPM | TEMPERATURE: 97.7 F | BODY MASS INDEX: 27.35 KG/M2 | SYSTOLIC BLOOD PRESSURE: 144 MMHG

## 2023-05-30 PROCEDURE — 99213 OFFICE O/P EST LOW 20 MIN: CPT

## 2023-05-30 NOTE — HISTORY OF PRESENT ILLNESS
[de-identified] : \par LEON MEDINA is a 63 y.o. M with a PMH significant for lumbar radiculopathy, left peripheral vision loss, ANNIA, and recent PE 2/3/23, who has been referred to our office for a hypercoag evaluation. \par \par 3/2/23 - 2/5/23 - Patient admitted to  with pleuritic right chest pains. Low grade temps. No recent trauma/travel/ prolonged immobilization. Found to have PE, no DVT's. \par \par 2/3/23 - CT Angio Chest - Filling defects identified within right lobar artery extending to RLL proximal segmental as well as RML segmental to subsegmental branches, compatible with pulmonary embolism.\par Mild cardiomegaly with enlargement the right cardiac chamber. \par 2/3/23 - B/L LE Dopplers - No evidence of DVT in either lower extremity.\par 2/3/23 - ECHO - No RV strain. \par Discharged on Xarelto. \par \par  Dyspnea has completely resolved. \par \par  [de-identified] : Completed Xarelto 2 weeks ago, remains completely asymptomatic.\par Hypercoag panel was negative.

## 2023-05-30 NOTE — ASSESSMENT
[FreeTextEntry1] : \par 63-year-old male, has completed 3 months of Xarelto after presenting with an unprovoked pulmonary embolism.\par Hypercoagulable panel was negative. \par No further clinical studies or continued anticoagulation required.\par Available for follow-up as needed.\par \par

## 2023-05-31 DIAGNOSIS — I26.99 OTHER PULMONARY EMBOLISM WITHOUT ACUTE COR PULMONALE: ICD-10-CM

## 2023-07-19 ENCOUNTER — RX ONLY (RX ONLY)
Age: 63
End: 2023-07-19

## 2023-07-19 ENCOUNTER — OFFICE (OUTPATIENT)
Dept: URBAN - METROPOLITAN AREA CLINIC 115 | Facility: CLINIC | Age: 63
Setting detail: OPHTHALMOLOGY
End: 2023-07-19
Payer: MEDICARE

## 2023-07-19 DIAGNOSIS — H47.212: ICD-10-CM

## 2023-07-19 DIAGNOSIS — G43.109: ICD-10-CM

## 2023-07-19 DIAGNOSIS — H25.13: ICD-10-CM

## 2023-07-19 DIAGNOSIS — H40.1132: ICD-10-CM

## 2023-07-19 DIAGNOSIS — H47.012: ICD-10-CM

## 2023-07-19 DIAGNOSIS — H43.812: ICD-10-CM

## 2023-07-19 DIAGNOSIS — H53.433: ICD-10-CM

## 2023-07-19 DIAGNOSIS — H35.033: ICD-10-CM

## 2023-07-19 PROCEDURE — 92014 COMPRE OPH EXAM EST PT 1/>: CPT | Performed by: OPHTHALMOLOGY

## 2023-07-19 PROCEDURE — 92083 EXTENDED VISUAL FIELD XM: CPT | Performed by: OPHTHALMOLOGY

## 2023-07-19 PROCEDURE — 92133 CPTRZD OPH DX IMG PST SGM ON: CPT | Performed by: OPHTHALMOLOGY

## 2023-07-19 ASSESSMENT — VISUAL ACUITY
OD_BCVA: 20/60
OS_BCVA: 20/30

## 2023-07-19 ASSESSMENT — REFRACTION_CURRENTRX
OS_OVR_VA: 20/
OS_ADD: +2.25
OD_AXIS: 077
OD_CYLINDER: -6.00
OS_CYLINDER: -4.50
OD_VPRISM_DIRECTION: PROGS
OD_ADD: +2.25
OS_VPRISM_DIRECTION: PROGS
OS_AXIS: 071
OD_OVR_VA: 20/
OS_SPHERE: -4.75
OD_SPHERE: -3.00

## 2023-07-19 ASSESSMENT — REFRACTION_AUTOREFRACTION
OD_CYLINDER: -7.75
OS_SPHERE: -2.75
OD_SPHERE: -1.25
OS_CYLINDER: -7.50
OD_AXIS: 082
OS_AXIS: 071

## 2023-07-19 ASSESSMENT — PACHYMETRY
OS_CT_CORRECTION: 1
OS_CT_UM: 530
OD_CT_UM: 533
OD_CT_CORRECTION: 1

## 2023-07-19 ASSESSMENT — SPHEQUIV_DERIVED
OS_SPHEQUIV: -6.5
OD_SPHEQUIV: -5.125

## 2023-07-19 ASSESSMENT — TONOMETRY
OD_IOP_MMHG: 18
OS_IOP_MMHG: 17

## 2023-07-19 ASSESSMENT — CONFRONTATIONAL VISUAL FIELD TEST (CVF)
OS_FINDINGS: FULL
OD_FINDINGS: FULL

## 2023-07-24 NOTE — ED PROVIDER NOTE - WR ORDER ID 1
Have You Had Laser Removal Of Brown Spots Before?: has not had previous treatment
When Outside In The Sun, Do You...: mostly burns, rarely tans
[id number]

## 2023-11-29 ENCOUNTER — INPATIENT (INPATIENT)
Facility: HOSPITAL | Age: 63
LOS: 1 days | Discharge: HOME | DRG: 322 | End: 2023-12-01
Attending: STUDENT IN AN ORGANIZED HEALTH CARE EDUCATION/TRAINING PROGRAM | Admitting: STUDENT IN AN ORGANIZED HEALTH CARE EDUCATION/TRAINING PROGRAM
Payer: MEDICARE

## 2023-11-29 VITALS
RESPIRATION RATE: 18 BRPM | SYSTOLIC BLOOD PRESSURE: 141 MMHG | TEMPERATURE: 98 F | DIASTOLIC BLOOD PRESSURE: 84 MMHG | HEART RATE: 71 BPM | HEIGHT: 68 IN | OXYGEN SATURATION: 99 % | WEIGHT: 179.9 LBS

## 2023-11-29 LAB
ADD ON TEST-SPECIMEN IN LAB: SIGNIFICANT CHANGE UP
ADD ON TEST-SPECIMEN IN LAB: SIGNIFICANT CHANGE UP
ALBUMIN SERPL ELPH-MCNC: 3.6 G/DL — SIGNIFICANT CHANGE UP (ref 3.3–5)
ALBUMIN SERPL ELPH-MCNC: 3.6 G/DL — SIGNIFICANT CHANGE UP (ref 3.3–5)
ALP SERPL-CCNC: 60 U/L — SIGNIFICANT CHANGE UP (ref 40–120)
ALP SERPL-CCNC: 60 U/L — SIGNIFICANT CHANGE UP (ref 40–120)
ALT FLD-CCNC: 29 U/L — SIGNIFICANT CHANGE UP (ref 12–78)
ALT FLD-CCNC: 29 U/L — SIGNIFICANT CHANGE UP (ref 12–78)
ANION GAP SERPL CALC-SCNC: 7 MMOL/L — SIGNIFICANT CHANGE UP (ref 5–17)
ANION GAP SERPL CALC-SCNC: 7 MMOL/L — SIGNIFICANT CHANGE UP (ref 5–17)
AST SERPL-CCNC: 33 U/L — SIGNIFICANT CHANGE UP (ref 15–37)
AST SERPL-CCNC: 33 U/L — SIGNIFICANT CHANGE UP (ref 15–37)
BASOPHILS # BLD AUTO: 0.1 K/UL — SIGNIFICANT CHANGE UP (ref 0–0.2)
BASOPHILS # BLD AUTO: 0.1 K/UL — SIGNIFICANT CHANGE UP (ref 0–0.2)
BASOPHILS NFR BLD AUTO: 0.7 % — SIGNIFICANT CHANGE UP (ref 0–2)
BASOPHILS NFR BLD AUTO: 0.7 % — SIGNIFICANT CHANGE UP (ref 0–2)
BILIRUB SERPL-MCNC: 1.1 MG/DL — SIGNIFICANT CHANGE UP (ref 0.2–1.2)
BILIRUB SERPL-MCNC: 1.1 MG/DL — SIGNIFICANT CHANGE UP (ref 0.2–1.2)
BUN SERPL-MCNC: 24 MG/DL — HIGH (ref 7–23)
BUN SERPL-MCNC: 24 MG/DL — HIGH (ref 7–23)
CALCIUM SERPL-MCNC: 9 MG/DL — SIGNIFICANT CHANGE UP (ref 8.5–10.1)
CALCIUM SERPL-MCNC: 9 MG/DL — SIGNIFICANT CHANGE UP (ref 8.5–10.1)
CHLORIDE SERPL-SCNC: 108 MMOL/L — SIGNIFICANT CHANGE UP (ref 96–108)
CHLORIDE SERPL-SCNC: 108 MMOL/L — SIGNIFICANT CHANGE UP (ref 96–108)
CO2 SERPL-SCNC: 28 MMOL/L — SIGNIFICANT CHANGE UP (ref 22–31)
CO2 SERPL-SCNC: 28 MMOL/L — SIGNIFICANT CHANGE UP (ref 22–31)
CREAT SERPL-MCNC: 1.23 MG/DL — SIGNIFICANT CHANGE UP (ref 0.5–1.3)
CREAT SERPL-MCNC: 1.23 MG/DL — SIGNIFICANT CHANGE UP (ref 0.5–1.3)
EGFR: 65 ML/MIN/1.73M2 — SIGNIFICANT CHANGE UP
EGFR: 65 ML/MIN/1.73M2 — SIGNIFICANT CHANGE UP
EOSINOPHIL # BLD AUTO: 0.49 K/UL — SIGNIFICANT CHANGE UP (ref 0–0.5)
EOSINOPHIL # BLD AUTO: 0.49 K/UL — SIGNIFICANT CHANGE UP (ref 0–0.5)
EOSINOPHIL NFR BLD AUTO: 3.6 % — SIGNIFICANT CHANGE UP (ref 0–6)
EOSINOPHIL NFR BLD AUTO: 3.6 % — SIGNIFICANT CHANGE UP (ref 0–6)
GLUCOSE SERPL-MCNC: 121 MG/DL — HIGH (ref 70–99)
GLUCOSE SERPL-MCNC: 121 MG/DL — HIGH (ref 70–99)
HCT VFR BLD CALC: 48.4 % — SIGNIFICANT CHANGE UP (ref 39–50)
HCT VFR BLD CALC: 48.4 % — SIGNIFICANT CHANGE UP (ref 39–50)
HGB BLD-MCNC: 16.2 G/DL — SIGNIFICANT CHANGE UP (ref 13–17)
HGB BLD-MCNC: 16.2 G/DL — SIGNIFICANT CHANGE UP (ref 13–17)
IMM GRANULOCYTES NFR BLD AUTO: 0.3 % — SIGNIFICANT CHANGE UP (ref 0–0.9)
IMM GRANULOCYTES NFR BLD AUTO: 0.3 % — SIGNIFICANT CHANGE UP (ref 0–0.9)
LYMPHOCYTES # BLD AUTO: 15.5 % — SIGNIFICANT CHANGE UP (ref 13–44)
LYMPHOCYTES # BLD AUTO: 15.5 % — SIGNIFICANT CHANGE UP (ref 13–44)
LYMPHOCYTES # BLD AUTO: 2.12 K/UL — SIGNIFICANT CHANGE UP (ref 1–3.3)
LYMPHOCYTES # BLD AUTO: 2.12 K/UL — SIGNIFICANT CHANGE UP (ref 1–3.3)
MAGNESIUM SERPL-MCNC: 2.5 MG/DL — SIGNIFICANT CHANGE UP (ref 1.6–2.6)
MAGNESIUM SERPL-MCNC: 2.5 MG/DL — SIGNIFICANT CHANGE UP (ref 1.6–2.6)
MCHC RBC-ENTMCNC: 29.9 PG — SIGNIFICANT CHANGE UP (ref 27–34)
MCHC RBC-ENTMCNC: 29.9 PG — SIGNIFICANT CHANGE UP (ref 27–34)
MCHC RBC-ENTMCNC: 33.5 GM/DL — SIGNIFICANT CHANGE UP (ref 32–36)
MCHC RBC-ENTMCNC: 33.5 GM/DL — SIGNIFICANT CHANGE UP (ref 32–36)
MCV RBC AUTO: 89.3 FL — SIGNIFICANT CHANGE UP (ref 80–100)
MCV RBC AUTO: 89.3 FL — SIGNIFICANT CHANGE UP (ref 80–100)
MONOCYTES # BLD AUTO: 0.95 K/UL — HIGH (ref 0–0.9)
MONOCYTES # BLD AUTO: 0.95 K/UL — HIGH (ref 0–0.9)
MONOCYTES NFR BLD AUTO: 6.9 % — SIGNIFICANT CHANGE UP (ref 2–14)
MONOCYTES NFR BLD AUTO: 6.9 % — SIGNIFICANT CHANGE UP (ref 2–14)
NEUTROPHILS # BLD AUTO: 9.98 K/UL — HIGH (ref 1.8–7.4)
NEUTROPHILS # BLD AUTO: 9.98 K/UL — HIGH (ref 1.8–7.4)
NEUTROPHILS NFR BLD AUTO: 73 % — SIGNIFICANT CHANGE UP (ref 43–77)
NEUTROPHILS NFR BLD AUTO: 73 % — SIGNIFICANT CHANGE UP (ref 43–77)
NT-PROBNP SERPL-SCNC: 51 PG/ML — SIGNIFICANT CHANGE UP (ref 0–125)
NT-PROBNP SERPL-SCNC: 51 PG/ML — SIGNIFICANT CHANGE UP (ref 0–125)
PLATELET # BLD AUTO: 273 K/UL — SIGNIFICANT CHANGE UP (ref 150–400)
PLATELET # BLD AUTO: 273 K/UL — SIGNIFICANT CHANGE UP (ref 150–400)
POTASSIUM SERPL-MCNC: 4.1 MMOL/L — SIGNIFICANT CHANGE UP (ref 3.5–5.3)
POTASSIUM SERPL-MCNC: 4.1 MMOL/L — SIGNIFICANT CHANGE UP (ref 3.5–5.3)
POTASSIUM SERPL-SCNC: 4.1 MMOL/L — SIGNIFICANT CHANGE UP (ref 3.5–5.3)
POTASSIUM SERPL-SCNC: 4.1 MMOL/L — SIGNIFICANT CHANGE UP (ref 3.5–5.3)
PROT SERPL-MCNC: 7.4 GM/DL — SIGNIFICANT CHANGE UP (ref 6–8.3)
PROT SERPL-MCNC: 7.4 GM/DL — SIGNIFICANT CHANGE UP (ref 6–8.3)
RBC # BLD: 5.42 M/UL — SIGNIFICANT CHANGE UP (ref 4.2–5.8)
RBC # BLD: 5.42 M/UL — SIGNIFICANT CHANGE UP (ref 4.2–5.8)
RBC # FLD: 12.9 % — SIGNIFICANT CHANGE UP (ref 10.3–14.5)
RBC # FLD: 12.9 % — SIGNIFICANT CHANGE UP (ref 10.3–14.5)
SODIUM SERPL-SCNC: 143 MMOL/L — SIGNIFICANT CHANGE UP (ref 135–145)
SODIUM SERPL-SCNC: 143 MMOL/L — SIGNIFICANT CHANGE UP (ref 135–145)
TROPONIN I, HIGH SENSITIVITY RESULT: 1758.82 NG/L — HIGH
TROPONIN I, HIGH SENSITIVITY RESULT: 1758.82 NG/L — HIGH
WBC # BLD: 13.68 K/UL — HIGH (ref 3.8–10.5)
WBC # BLD: 13.68 K/UL — HIGH (ref 3.8–10.5)
WBC # FLD AUTO: 13.68 K/UL — HIGH (ref 3.8–10.5)
WBC # FLD AUTO: 13.68 K/UL — HIGH (ref 3.8–10.5)

## 2023-11-29 PROCEDURE — 86901 BLOOD TYPING SEROLOGIC RH(D): CPT

## 2023-11-29 PROCEDURE — 93458 L HRT ARTERY/VENTRICLE ANGIO: CPT | Mod: 59

## 2023-11-29 PROCEDURE — C1725: CPT

## 2023-11-29 PROCEDURE — C9600: CPT | Mod: RI

## 2023-11-29 PROCEDURE — 86900 BLOOD TYPING SEROLOGIC ABO: CPT

## 2023-11-29 PROCEDURE — 83036 HEMOGLOBIN GLYCOSYLATED A1C: CPT

## 2023-11-29 PROCEDURE — 36415 COLL VENOUS BLD VENIPUNCTURE: CPT

## 2023-11-29 PROCEDURE — C1769: CPT

## 2023-11-29 PROCEDURE — 85027 COMPLETE CBC AUTOMATED: CPT

## 2023-11-29 PROCEDURE — 80048 BASIC METABOLIC PNL TOTAL CA: CPT

## 2023-11-29 PROCEDURE — 86850 RBC ANTIBODY SCREEN: CPT

## 2023-11-29 PROCEDURE — 85730 THROMBOPLASTIN TIME PARTIAL: CPT

## 2023-11-29 PROCEDURE — 99285 EMERGENCY DEPT VISIT HI MDM: CPT

## 2023-11-29 PROCEDURE — 71045 X-RAY EXAM CHEST 1 VIEW: CPT | Mod: 26

## 2023-11-29 PROCEDURE — 93306 TTE W/DOPPLER COMPLETE: CPT

## 2023-11-29 PROCEDURE — 80061 LIPID PANEL: CPT

## 2023-11-29 PROCEDURE — 84484 ASSAY OF TROPONIN QUANT: CPT

## 2023-11-29 PROCEDURE — C1874: CPT

## 2023-11-29 PROCEDURE — C1887: CPT

## 2023-11-29 PROCEDURE — 94660 CPAP INITIATION&MGMT: CPT

## 2023-11-29 PROCEDURE — 83735 ASSAY OF MAGNESIUM: CPT

## 2023-11-29 PROCEDURE — 93005 ELECTROCARDIOGRAM TRACING: CPT

## 2023-11-29 PROCEDURE — C1894: CPT

## 2023-11-29 PROCEDURE — 93010 ELECTROCARDIOGRAM REPORT: CPT | Mod: 76

## 2023-11-29 RX ORDER — CLOPIDOGREL BISULFATE 75 MG/1
300 TABLET, FILM COATED ORAL ONCE
Refills: 0 | Status: COMPLETED | OUTPATIENT
Start: 2023-11-29 | End: 2023-11-29

## 2023-11-29 RX ORDER — LATANOPROST 0.05 MG/ML
1 SOLUTION/ DROPS OPHTHALMIC; TOPICAL
Qty: 0 | Refills: 0 | DISCHARGE

## 2023-11-29 RX ORDER — BRIMONIDINE TARTRATE 2 MG/MG
1 SOLUTION/ DROPS OPHTHALMIC
Qty: 0 | Refills: 0 | DISCHARGE

## 2023-11-29 RX ORDER — HEPARIN SODIUM 5000 [USP'U]/ML
INJECTION INTRAVENOUS; SUBCUTANEOUS
Qty: 25000 | Refills: 0 | Status: DISCONTINUED | OUTPATIENT
Start: 2023-11-29 | End: 2023-11-30

## 2023-11-29 RX ORDER — HEPARIN SODIUM 5000 [USP'U]/ML
5000 INJECTION INTRAVENOUS; SUBCUTANEOUS EVERY 6 HOURS
Refills: 0 | Status: DISCONTINUED | OUTPATIENT
Start: 2023-11-29 | End: 2023-11-30

## 2023-11-29 RX ORDER — HEPARIN SODIUM 5000 [USP'U]/ML
5000 INJECTION INTRAVENOUS; SUBCUTANEOUS ONCE
Refills: 0 | Status: COMPLETED | OUTPATIENT
Start: 2023-11-29 | End: 2023-11-29

## 2023-11-29 RX ADMIN — HEPARIN SODIUM 5000 UNIT(S): 5000 INJECTION INTRAVENOUS; SUBCUTANEOUS at 23:44

## 2023-11-29 RX ADMIN — HEPARIN SODIUM 1000 UNIT(S)/HR: 5000 INJECTION INTRAVENOUS; SUBCUTANEOUS at 23:44

## 2023-11-29 RX ADMIN — CLOPIDOGREL BISULFATE 300 MILLIGRAM(S): 75 TABLET, FILM COATED ORAL at 23:19

## 2023-11-29 NOTE — ED ADULT NURSE NOTE - OBJECTIVE STATEMENT
pt presents to er c/o jaw and shoulder pain. pt reports he was at yoga when he started having indigestion, was burping and felt off. pt reports he then started developing R. jaw and R. shoudler pain, and started sweating. pt went to urgent care and was told to come to the ER. pt reports pain is somewhat better, is now complaining of headache. pt also reports when he was in urgent care, his BP was in 200s systolic

## 2023-11-29 NOTE — ED ADULT NURSE NOTE - NSFALLUNIVINTERV_ED_ALL_ED
Bed/Stretcher in lowest position, wheels locked, appropriate side rails in place/Call bell, personal items and telephone in reach/Instruct patient to call for assistance before getting out of bed/chair/stretcher/Non-slip footwear applied when patient is off stretcher/[place] to call system/Physically safe environment - no spills, clutter or unnecessary equipment/Purposeful proactive rounding/Room/bathroom lighting operational, light cord in reach

## 2023-11-29 NOTE — PHARMACOTHERAPY INTERVENTION NOTE - COMMENTS
Medication reconciliation completed.  Reviewed Medication list and confirmed med allergies with patient; confirmed with Dr. First Medahsan.

## 2023-11-29 NOTE — ED ADULT TRIAGE NOTE - CHIEF COMPLAINT QUOTE
Pt c/o R shoulder, R jaw, and epigastric pain starting at 7pm today. Seen at urgent care and given 324mg ASA. HX of kidney stones, PE Feb 2023. Denies chest pain, palpitations, and SOB. STAT EKG to be completed.

## 2023-11-30 DIAGNOSIS — I21.4 NON-ST ELEVATION (NSTEMI) MYOCARDIAL INFARCTION: ICD-10-CM

## 2023-11-30 DIAGNOSIS — R03.0 ELEVATED BLOOD-PRESSURE READING, WITHOUT DIAGNOSIS OF HYPERTENSION: ICD-10-CM

## 2023-11-30 LAB
A1C WITH ESTIMATED AVERAGE GLUCOSE RESULT: 5.4 % — SIGNIFICANT CHANGE UP (ref 4–5.6)
A1C WITH ESTIMATED AVERAGE GLUCOSE RESULT: 5.4 % — SIGNIFICANT CHANGE UP (ref 4–5.6)
ANION GAP SERPL CALC-SCNC: 8 MMOL/L — SIGNIFICANT CHANGE UP (ref 5–17)
ANION GAP SERPL CALC-SCNC: 8 MMOL/L — SIGNIFICANT CHANGE UP (ref 5–17)
APTT BLD: 30.8 SEC — SIGNIFICANT CHANGE UP (ref 24.5–35.6)
APTT BLD: 30.8 SEC — SIGNIFICANT CHANGE UP (ref 24.5–35.6)
APTT BLD: 63.7 SEC — HIGH (ref 24.5–35.6)
APTT BLD: 63.7 SEC — HIGH (ref 24.5–35.6)
BUN SERPL-MCNC: 19 MG/DL — SIGNIFICANT CHANGE UP (ref 7–23)
BUN SERPL-MCNC: 19 MG/DL — SIGNIFICANT CHANGE UP (ref 7–23)
CALCIUM SERPL-MCNC: 8.6 MG/DL — SIGNIFICANT CHANGE UP (ref 8.5–10.1)
CALCIUM SERPL-MCNC: 8.6 MG/DL — SIGNIFICANT CHANGE UP (ref 8.5–10.1)
CHLORIDE SERPL-SCNC: 109 MMOL/L — HIGH (ref 96–108)
CHLORIDE SERPL-SCNC: 109 MMOL/L — HIGH (ref 96–108)
CHOLEST SERPL-MCNC: 212 MG/DL — HIGH
CHOLEST SERPL-MCNC: 212 MG/DL — HIGH
CO2 SERPL-SCNC: 25 MMOL/L — SIGNIFICANT CHANGE UP (ref 22–31)
CO2 SERPL-SCNC: 25 MMOL/L — SIGNIFICANT CHANGE UP (ref 22–31)
CREAT SERPL-MCNC: 1.2 MG/DL — SIGNIFICANT CHANGE UP (ref 0.5–1.3)
CREAT SERPL-MCNC: 1.2 MG/DL — SIGNIFICANT CHANGE UP (ref 0.5–1.3)
EGFR: 67 ML/MIN/1.73M2 — SIGNIFICANT CHANGE UP
EGFR: 67 ML/MIN/1.73M2 — SIGNIFICANT CHANGE UP
ESTIMATED AVERAGE GLUCOSE: 108 MG/DL — SIGNIFICANT CHANGE UP (ref 68–114)
ESTIMATED AVERAGE GLUCOSE: 108 MG/DL — SIGNIFICANT CHANGE UP (ref 68–114)
GLUCOSE SERPL-MCNC: 112 MG/DL — HIGH (ref 70–99)
GLUCOSE SERPL-MCNC: 112 MG/DL — HIGH (ref 70–99)
HCT VFR BLD CALC: 44.6 % — SIGNIFICANT CHANGE UP (ref 39–50)
HCT VFR BLD CALC: 44.6 % — SIGNIFICANT CHANGE UP (ref 39–50)
HDLC SERPL-MCNC: 63 MG/DL — SIGNIFICANT CHANGE UP
HDLC SERPL-MCNC: 63 MG/DL — SIGNIFICANT CHANGE UP
HGB BLD-MCNC: 15 G/DL — SIGNIFICANT CHANGE UP (ref 13–17)
HGB BLD-MCNC: 15 G/DL — SIGNIFICANT CHANGE UP (ref 13–17)
INR BLD: 0.93 RATIO — SIGNIFICANT CHANGE UP (ref 0.85–1.18)
INR BLD: 0.93 RATIO — SIGNIFICANT CHANGE UP (ref 0.85–1.18)
LIPID PNL WITH DIRECT LDL SERPL: 137 MG/DL — HIGH
LIPID PNL WITH DIRECT LDL SERPL: 137 MG/DL — HIGH
MAGNESIUM SERPL-MCNC: 2.2 MG/DL — SIGNIFICANT CHANGE UP (ref 1.6–2.6)
MAGNESIUM SERPL-MCNC: 2.2 MG/DL — SIGNIFICANT CHANGE UP (ref 1.6–2.6)
MCHC RBC-ENTMCNC: 29.6 PG — SIGNIFICANT CHANGE UP (ref 27–34)
MCHC RBC-ENTMCNC: 29.6 PG — SIGNIFICANT CHANGE UP (ref 27–34)
MCHC RBC-ENTMCNC: 33.6 GM/DL — SIGNIFICANT CHANGE UP (ref 32–36)
MCHC RBC-ENTMCNC: 33.6 GM/DL — SIGNIFICANT CHANGE UP (ref 32–36)
MCV RBC AUTO: 88 FL — SIGNIFICANT CHANGE UP (ref 80–100)
MCV RBC AUTO: 88 FL — SIGNIFICANT CHANGE UP (ref 80–100)
NON HDL CHOLESTEROL: 149 MG/DL — HIGH
NON HDL CHOLESTEROL: 149 MG/DL — HIGH
PLATELET # BLD AUTO: 258 K/UL — SIGNIFICANT CHANGE UP (ref 150–400)
PLATELET # BLD AUTO: 258 K/UL — SIGNIFICANT CHANGE UP (ref 150–400)
POTASSIUM SERPL-MCNC: 3.8 MMOL/L — SIGNIFICANT CHANGE UP (ref 3.5–5.3)
POTASSIUM SERPL-MCNC: 3.8 MMOL/L — SIGNIFICANT CHANGE UP (ref 3.5–5.3)
POTASSIUM SERPL-SCNC: 3.8 MMOL/L — SIGNIFICANT CHANGE UP (ref 3.5–5.3)
POTASSIUM SERPL-SCNC: 3.8 MMOL/L — SIGNIFICANT CHANGE UP (ref 3.5–5.3)
PROTHROM AB SERPL-ACNC: 10.5 SEC — SIGNIFICANT CHANGE UP (ref 9.5–13)
PROTHROM AB SERPL-ACNC: 10.5 SEC — SIGNIFICANT CHANGE UP (ref 9.5–13)
RBC # BLD: 5.07 M/UL — SIGNIFICANT CHANGE UP (ref 4.2–5.8)
RBC # BLD: 5.07 M/UL — SIGNIFICANT CHANGE UP (ref 4.2–5.8)
RBC # FLD: 12.8 % — SIGNIFICANT CHANGE UP (ref 10.3–14.5)
RBC # FLD: 12.8 % — SIGNIFICANT CHANGE UP (ref 10.3–14.5)
SODIUM SERPL-SCNC: 142 MMOL/L — SIGNIFICANT CHANGE UP (ref 135–145)
SODIUM SERPL-SCNC: 142 MMOL/L — SIGNIFICANT CHANGE UP (ref 135–145)
TRIGL SERPL-MCNC: 69 MG/DL — SIGNIFICANT CHANGE UP
TRIGL SERPL-MCNC: 69 MG/DL — SIGNIFICANT CHANGE UP
TROPONIN I, HIGH SENSITIVITY RESULT: HIGH NG/L
WBC # BLD: 11.45 K/UL — HIGH (ref 3.8–10.5)
WBC # BLD: 11.45 K/UL — HIGH (ref 3.8–10.5)
WBC # FLD AUTO: 11.45 K/UL — HIGH (ref 3.8–10.5)
WBC # FLD AUTO: 11.45 K/UL — HIGH (ref 3.8–10.5)

## 2023-11-30 PROCEDURE — 99223 1ST HOSP IP/OBS HIGH 75: CPT

## 2023-11-30 PROCEDURE — 93458 L HRT ARTERY/VENTRICLE ANGIO: CPT | Mod: 26,59

## 2023-11-30 PROCEDURE — 99152 MOD SED SAME PHYS/QHP 5/>YRS: CPT

## 2023-11-30 PROCEDURE — 92928 PRQ TCAT PLMT NTRAC ST 1 LES: CPT | Mod: RI

## 2023-11-30 PROCEDURE — 93010 ELECTROCARDIOGRAM REPORT: CPT

## 2023-11-30 RX ORDER — SODIUM CHLORIDE 9 MG/ML
1000 INJECTION INTRAMUSCULAR; INTRAVENOUS; SUBCUTANEOUS
Refills: 0 | Status: DISCONTINUED | OUTPATIENT
Start: 2023-11-30 | End: 2023-12-01

## 2023-11-30 RX ORDER — LISINOPRIL 2.5 MG/1
5 TABLET ORAL DAILY
Refills: 0 | Status: DISCONTINUED | OUTPATIENT
Start: 2023-11-30 | End: 2023-12-01

## 2023-11-30 RX ORDER — ASPIRIN/CALCIUM CARB/MAGNESIUM 324 MG
81 TABLET ORAL DAILY
Refills: 0 | Status: DISCONTINUED | OUTPATIENT
Start: 2023-11-30 | End: 2023-12-01

## 2023-11-30 RX ORDER — ONDANSETRON 8 MG/1
4 TABLET, FILM COATED ORAL EVERY 8 HOURS
Refills: 0 | Status: DISCONTINUED | OUTPATIENT
Start: 2023-11-30 | End: 2023-12-01

## 2023-11-30 RX ORDER — TICAGRELOR 90 MG/1
90 TABLET ORAL EVERY 12 HOURS
Refills: 0 | Status: DISCONTINUED | OUTPATIENT
Start: 2023-11-30 | End: 2023-12-01

## 2023-11-30 RX ORDER — LANOLIN ALCOHOL/MO/W.PET/CERES
3 CREAM (GRAM) TOPICAL AT BEDTIME
Refills: 0 | Status: DISCONTINUED | OUTPATIENT
Start: 2023-11-30 | End: 2023-12-01

## 2023-11-30 RX ORDER — DORZOLAMIDE HYDROCHLORIDE 20 MG/ML
1 SOLUTION/ DROPS OPHTHALMIC
Refills: 0 | Status: DISCONTINUED | OUTPATIENT
Start: 2023-11-30 | End: 2023-12-01

## 2023-11-30 RX ORDER — ACETAMINOPHEN 500 MG
650 TABLET ORAL EVERY 6 HOURS
Refills: 0 | Status: DISCONTINUED | OUTPATIENT
Start: 2023-11-30 | End: 2023-12-01

## 2023-11-30 RX ORDER — METOPROLOL TARTRATE 50 MG
25 TABLET ORAL
Refills: 0 | Status: DISCONTINUED | OUTPATIENT
Start: 2023-11-30 | End: 2023-12-01

## 2023-11-30 RX ORDER — CLOPIDOGREL BISULFATE 75 MG/1
75 TABLET, FILM COATED ORAL DAILY
Refills: 0 | Status: DISCONTINUED | OUTPATIENT
Start: 2023-11-30 | End: 2023-11-30

## 2023-11-30 RX ORDER — ATORVASTATIN CALCIUM 80 MG/1
80 TABLET, FILM COATED ORAL AT BEDTIME
Refills: 0 | Status: DISCONTINUED | OUTPATIENT
Start: 2023-11-30 | End: 2023-12-01

## 2023-11-30 RX ADMIN — TICAGRELOR 90 MILLIGRAM(S): 90 TABLET ORAL at 21:09

## 2023-11-30 RX ADMIN — LISINOPRIL 5 MILLIGRAM(S): 2.5 TABLET ORAL at 17:52

## 2023-11-30 RX ADMIN — SODIUM CHLORIDE 200 MILLILITER(S): 9 INJECTION INTRAMUSCULAR; INTRAVENOUS; SUBCUTANEOUS at 10:14

## 2023-11-30 RX ADMIN — DORZOLAMIDE HYDROCHLORIDE 1 DROP(S): 20 SOLUTION/ DROPS OPHTHALMIC at 21:09

## 2023-11-30 RX ADMIN — Medication 25 MILLIGRAM(S): at 21:09

## 2023-11-30 RX ADMIN — ATORVASTATIN CALCIUM 80 MILLIGRAM(S): 80 TABLET, FILM COATED ORAL at 21:09

## 2023-11-30 RX ADMIN — HEPARIN SODIUM 1000 UNIT(S)/HR: 5000 INJECTION INTRAVENOUS; SUBCUTANEOUS at 07:26

## 2023-11-30 RX ADMIN — SODIUM CHLORIDE 250 MILLILITER(S): 9 INJECTION INTRAMUSCULAR; INTRAVENOUS; SUBCUTANEOUS at 08:30

## 2023-11-30 NOTE — BRIEF OPERATIVE NOTE - NSICDXBRIEFPROCEDURE_GEN_ALL_CORE_FT
PROCEDURES:  PTCA, with stent insertion, in cardiac catheterization lab 30-Nov-2023 12:26:59  Keegan Perez

## 2023-11-30 NOTE — ED PROVIDER NOTE - CLINICAL SUMMARY MEDICAL DECISION MAKING FREE TEXT BOX
63-year-old male with chest pain and diaphoresis.  Will evaluate for ACS.  Labs with elevated troponins will treat & admit for NSTEMI.

## 2023-11-30 NOTE — CONSULT NOTE ADULT - PROBLEM SELECTOR RECOMMENDATION 9
Acute NSTEMI with rising troponin and frequent ventricular ectopy on telemetry - currently asymptomatic and appropriately treated with Plavix, Aspirin, UFH prior to my encounter.    Plan:  L heart cath this AM (I discussed case with Dr Raya).  Echocardiography.

## 2023-11-30 NOTE — H&P ADULT - HISTORY OF PRESENT ILLNESS
62 yo Male with no significant PMHX presented with chest pain after a Yoga session. Pain started in the right jaw area, then moved to the right side of the chest with diaphoresis. His pain lasted from 7:30 pm to 11:30 pm last night. No prior h/o of any CAD. His father had CABG x4 , when he was 80 years old. No other complain, Patient is pain free now,

## 2023-11-30 NOTE — BRIEF OPERATIVE NOTE - NSICDXBRIEFPREOP_GEN_ALL_CORE_FT
PRE-OP DIAGNOSIS:  NSTEMI (non-ST elevation myocardial infarction) 30-Nov-2023 12:28:00  Keegan Perez

## 2023-11-30 NOTE — ED CLERICAL - NS ED CLERK NOTE PRE-ARRIVAL INFORMATION; ADDITIONAL PRE-ARRIVAL INFORMATION
This patient is enrolled in the readmission reduction program and has active care navigation. This patient can be followed up by the care navigation team within 24 hours. To arrange close follow-up or to obtain additional clinical information about this patient, please call the contact number above. Please call the hospitalist as needed to collaborate on further medical management ([phone])

## 2023-11-30 NOTE — CONSULT NOTE ADULT - PROBLEM SELECTOR RECOMMENDATION 2
BP elevated in ED -- no history of HTN; will monitor and titrate Rx as appropriate.  Continue metoprolol and lisinopril.

## 2023-11-30 NOTE — H&P ADULT - ASSESSMENT
A/P:    1.  NSTEMI  -no chest pain now  -no acute EKG change   -started on heparin drip  -on Aspirin, Plavix, Statin, BB, ACEI  -follow lipid profile, A1C  -follow repeat troponin  -follow echo  -follow cardiology consult    2.  Heparin for DVT ppx    3.  Code status  -full code

## 2023-11-30 NOTE — ED PROVIDER NOTE - OBJECTIVE STATEMENT
CP after yoga, right side with diaphoresis. s/p ASA at . now better. 63-year-old male with CP after yoga session this afternoon. Pain is right sided with  radiation to his jaw and diaphoresis.  patient initially went to urgent care who was then referred to the ED.   States he was given aspirin at the urgent care.  He is now feeling better and reports absence of chest pain.

## 2023-11-30 NOTE — PATIENT PROFILE ADULT - FALL HARM RISK - UNIVERSAL INTERVENTIONS
Bed in lowest position, wheels locked, appropriate side rails in place/Call bell, personal items and telephone in reach/Instruct patient to call for assistance before getting out of bed or chair/Non-slip footwear when patient is out of bed/[place] to call system/Physically safe environment - no spills, clutter or unnecessary equipment/Purposeful Proactive Rounding/Room/bathroom lighting operational, light cord in reach

## 2023-11-30 NOTE — CONSULT NOTE ADULT - SUBJECTIVE AND OBJECTIVE BOX
CHIEF COMPLAINT: Patient is a 63y old  Male who presents with a chief complaint of jaw, shoulder pain    HPI:  63 year old man with a history of pulmonary embolism (2/2023; treated with Xarelto x 3 months) and glaucoma presented to the ER after 1st seeking care at an urgent care center.  He describes the abrupt onset of indigestion after dinner followed by right shoulder and right jaw pain associated with diaphoresis during a yoga class; no clear exacerbating or alleviating factors.  He reports no history of heart disease and excellent baseline functional status / exercise capacity. In the the ED was diagnosed with a NSTEMI and treated with aspirin, Plavix, UFH.    PAST MEDICAL & SURGICAL HISTORY:  PE  No significant past surgical history      SOCIAL HISTORY:   Alcohol: Denied  Smoking: Nonsmoker  Marital Status:     FAMILY HISTORY: Father with CABG    MEDICATIONS  (STANDING):  aspirin enteric coated 81 milliGRAM(s) Oral daily  atorvastatin 80 milliGRAM(s) Oral at bedtime  clopidogrel Tablet 75 milliGRAM(s) Oral daily  dorzolamide 2% Ophthalmic Solution 1 Drop(s) Both EYES <User Schedule>  heparin  Infusion.  Unit(s)/Hr (10 mL/Hr) IV Continuous <Continuous>  lisinopril 5 milliGRAM(s) Oral daily  metoprolol tartrate 25 milliGRAM(s) Oral two times a day    MEDICATIONS  (PRN):  acetaminophen     Tablet .. 650 milliGRAM(s) Oral every 6 hours PRN Temp greater or equal to 38C (100.4F), Mild Pain (1 - 3)  aluminum hydroxide/magnesium hydroxide/simethicone Suspension 30 milliLiter(s) Oral every 4 hours PRN Dyspepsia  heparin   Injectable 5000 Unit(s) IV Push every 6 hours PRN For aPTT less than 40  melatonin 3 milliGRAM(s) Oral at bedtime PRN Insomnia  ondansetron Injectable 4 milliGRAM(s) IV Push every 8 hours PRN Nausea and/or Vomiting    Allergies:   No Known Allergies    REVIEW OF SYSTEMS:  CONSTITUTIONAL: No weakness, fevers or chills  Eyes: No visual changes  NECK: No pain or stiffness  RESPIRATORY: No cough, wheezing, hemoptysis; No shortness of breath  CARDIOVASCULAR: Mild chest pain (not present now)  GASTROINTESTINAL: + "indigestion"  GENITOURINARY: No dysuria, frequency or hematuria  NEUROLOGICAL: No numbness.  SKIN: No itching or rash  All other review of systems is negative unless indicated above    VITAL SIGNS:   Vital Signs Last 24 Hrs  T(C): 36.7 (30 Nov 2023 04:00), Max: 36.9 (29 Nov 2023 21:31)  T(F): 98 (30 Nov 2023 04:00), Max: 98.4 (29 Nov 2023 21:31)  HR: 65 (30 Nov 2023 07:11) (57 - 71)  BP: 167/72 (30 Nov 2023 07:11) (131/83 - 167/72)  BP(mean): 96 (30 Nov 2023 07:11) (93 - 103)  RR: 19 (30 Nov 2023 07:11) (18 - 20)  SpO2: 95% (30 Nov 2023 07:11) (95% - 99%)  Patient On (Oxygen Delivery Method): room air    PHYSICAL EXAM:  Constitutional: NAD, awake and alert  HEENT:  EOMI,  Pupils round, No oral cyanosis. Wearing eyeglasses  Pulmonary: Non-labored, breath sounds are clear bilaterally, No wheezing, rales or rhonchi  Cardiovascular: S1 and S2, regular rate and rhythm, no Murmurs, gallops or rubs  Gastrointestinal: Bowel Sounds present, soft, nontender.   Lymph: No peripheral edema. No cervical lymphadenopathy.  Neurological: Alert, no focal deficits  Skin: No rashes.  Psych:  Mood & affect appropriate    LABS:                    15.0   11.45 )-----------( 258      ( 30 Nov 2023 06:30 )             44.6          142    |  109    |  19     ----------------------------<  112    3.8     |  25     |  1.20     Ca    8.6        30 Nov 2023 06:30  Mg     2.2       30 Nov 2023 06:30    TPro  7.4    /  Alb  3.6    /  TBili  1.1    /  DBili  x      /  AST  33     /  ALT  29     /  AlkPhos  60     29 Nov 2023 22:07    PT/INR - ( 29 Nov 2023 22:07 )   PT: 10.5 sec;   INR: 0.93 ratio    PTT - ( 30 Nov 2023 06:30 )  PTT:63.7 sec    TroponinI hsT: <-74726.92, <-36549.98, <-1758.82    ECG: NSR

## 2023-11-30 NOTE — H&P ADULT - NSHPREVIEWOFSYSTEMS_GEN_ALL_CORE
Gen: No fever, chills, weakness  ENT: No visual changes or throat pain  Neck: No pain or stiffness  Respiratory: No cough or wheezing  Cardiovascular: ++ chest pain , no palpitations  Gastrointestinal: No abdominal pain, nausea, vomiting, constipation, or diarrhea  Hematologic: No easy bleeding or bruising  Neurologic: No numbness or focal weakness  Psych: No depression or insomnia  Skin: No rash or itching

## 2023-11-30 NOTE — PACU DISCHARGE NOTE - COMMENTS
Report given to Layne lisa on 3 north, Pt is alert and oriented x 3, vital signs stable.  monitor pattern regular sinus rhythm, Right radial bulky dressing intact with fingers warm and mobile with good capillary refill.  Denies pain or discomfort.  Pt left with life samina attached without any complaints

## 2023-11-30 NOTE — PROGRESS NOTE ADULT - ASSESSMENT
HPI:  62 yo Male with no significant PMHX presented with chest pain after a Yoga session. Pain started in the right jaw area, then moved to the right side of the chest with diaphoresis. His pain lasted from 7:30 pm to 11:30 pm last night. No prior h/o of any CAD. His father had CABG x4 , when he was 80 years old. 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

## 2023-11-30 NOTE — H&P ADULT - NSHPPHYSICALEXAM_GEN_ALL_CORE
T(C): 36.7 (11-29-23 @ 23:30), Max: 36.9 (11-29-23 @ 21:31)  HR: 57 (11-30-23 @ 02:19) (57 - 71)  BP: 142/85 (11-30-23 @ 02:19) (135/75 - 145/75)  RR: 18 (11-30-23 @ 02:19) (18 - 20)  SpO2: 95% (11-30-23 @ 02:19) (95% - 99%)    CONSTITUTIONAL: Well groomed, no apparent distress  EYES: PERRLA and symmetric, EOMI, No conjunctival or scleral injection, non-icteric  ENMT: Oral mucosa with moist membranes. no pharyngeal injection or exudates             NECK: Supple, symmetric and without tracheal deviation   RESP: No respiratory distress, no use of accessory muscles; CTA b/l, no WRR  CV: RRR, +S1S2, no MRG; no JVD; no peripheral edema  GI: Soft, NT, ND, no rebound, no guarding; no palpable masses;   LYMPH: No cervical LAD or tenderness;   MSK: Normal ROM without pain, normal muscle strength/tone  SKIN: No rashes or ulcers noted;   NEURO: CN II-XII intact; normal reflexes in upper and lower extremities, sensation intact in upper and lower extremities b/l to light touch   PSYCH: Appropriate insight/judgment; A+O x 3, mood and affect appropriate, recent/remote memory intact

## 2023-11-30 NOTE — CHART NOTE - NSCHARTNOTEFT_GEN_A_CORE
Patient seen in post-op cath. Feels well, no acute issues. s/p LHC with 2 stents placed.    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

## 2023-11-30 NOTE — ED ADULT NURSE REASSESSMENT NOTE - NS ED NURSE REASSESS COMMENT FT1
MD John called via phone and updated on patient's rpt high troponin and status. To continue to monitor until cardiology sees patient, no new orders at this time.

## 2023-12-01 ENCOUNTER — TRANSCRIPTION ENCOUNTER (OUTPATIENT)
Age: 63
End: 2023-12-01

## 2023-12-01 VITALS
DIASTOLIC BLOOD PRESSURE: 57 MMHG | HEART RATE: 52 BPM | RESPIRATION RATE: 18 BRPM | TEMPERATURE: 98 F | SYSTOLIC BLOOD PRESSURE: 103 MMHG | OXYGEN SATURATION: 98 %

## 2023-12-01 LAB
ANION GAP SERPL CALC-SCNC: 8 MMOL/L — SIGNIFICANT CHANGE UP (ref 5–17)
ANION GAP SERPL CALC-SCNC: 8 MMOL/L — SIGNIFICANT CHANGE UP (ref 5–17)
BUN SERPL-MCNC: 14 MG/DL — SIGNIFICANT CHANGE UP (ref 7–23)
BUN SERPL-MCNC: 14 MG/DL — SIGNIFICANT CHANGE UP (ref 7–23)
CALCIUM SERPL-MCNC: 9.2 MG/DL — SIGNIFICANT CHANGE UP (ref 8.5–10.1)
CALCIUM SERPL-MCNC: 9.2 MG/DL — SIGNIFICANT CHANGE UP (ref 8.5–10.1)
CHLORIDE SERPL-SCNC: 108 MMOL/L — SIGNIFICANT CHANGE UP (ref 96–108)
CHLORIDE SERPL-SCNC: 108 MMOL/L — SIGNIFICANT CHANGE UP (ref 96–108)
CHOLEST SERPL-MCNC: 215 MG/DL — HIGH
CHOLEST SERPL-MCNC: 215 MG/DL — HIGH
CO2 SERPL-SCNC: 24 MMOL/L — SIGNIFICANT CHANGE UP (ref 22–31)
CO2 SERPL-SCNC: 24 MMOL/L — SIGNIFICANT CHANGE UP (ref 22–31)
CREAT SERPL-MCNC: 1.37 MG/DL — HIGH (ref 0.5–1.3)
CREAT SERPL-MCNC: 1.37 MG/DL — HIGH (ref 0.5–1.3)
EGFR: 57 ML/MIN/1.73M2 — LOW
EGFR: 57 ML/MIN/1.73M2 — LOW
GLUCOSE SERPL-MCNC: 100 MG/DL — HIGH (ref 70–99)
GLUCOSE SERPL-MCNC: 100 MG/DL — HIGH (ref 70–99)
HCT VFR BLD CALC: 46.8 % — SIGNIFICANT CHANGE UP (ref 39–50)
HCT VFR BLD CALC: 46.8 % — SIGNIFICANT CHANGE UP (ref 39–50)
HDLC SERPL-MCNC: 62 MG/DL — SIGNIFICANT CHANGE UP
HDLC SERPL-MCNC: 62 MG/DL — SIGNIFICANT CHANGE UP
HGB BLD-MCNC: 15.8 G/DL — SIGNIFICANT CHANGE UP (ref 13–17)
HGB BLD-MCNC: 15.8 G/DL — SIGNIFICANT CHANGE UP (ref 13–17)
LIPID PNL WITH DIRECT LDL SERPL: 140 MG/DL — HIGH
LIPID PNL WITH DIRECT LDL SERPL: 140 MG/DL — HIGH
MCHC RBC-ENTMCNC: 30 PG — SIGNIFICANT CHANGE UP (ref 27–34)
MCHC RBC-ENTMCNC: 30 PG — SIGNIFICANT CHANGE UP (ref 27–34)
MCHC RBC-ENTMCNC: 33.8 GM/DL — SIGNIFICANT CHANGE UP (ref 32–36)
MCHC RBC-ENTMCNC: 33.8 GM/DL — SIGNIFICANT CHANGE UP (ref 32–36)
MCV RBC AUTO: 89 FL — SIGNIFICANT CHANGE UP (ref 80–100)
MCV RBC AUTO: 89 FL — SIGNIFICANT CHANGE UP (ref 80–100)
NON HDL CHOLESTEROL: 153 MG/DL — HIGH
NON HDL CHOLESTEROL: 153 MG/DL — HIGH
PLATELET # BLD AUTO: 272 K/UL — SIGNIFICANT CHANGE UP (ref 150–400)
PLATELET # BLD AUTO: 272 K/UL — SIGNIFICANT CHANGE UP (ref 150–400)
POTASSIUM SERPL-MCNC: 3.8 MMOL/L — SIGNIFICANT CHANGE UP (ref 3.5–5.3)
POTASSIUM SERPL-MCNC: 3.8 MMOL/L — SIGNIFICANT CHANGE UP (ref 3.5–5.3)
POTASSIUM SERPL-SCNC: 3.8 MMOL/L — SIGNIFICANT CHANGE UP (ref 3.5–5.3)
POTASSIUM SERPL-SCNC: 3.8 MMOL/L — SIGNIFICANT CHANGE UP (ref 3.5–5.3)
RBC # BLD: 5.26 M/UL — SIGNIFICANT CHANGE UP (ref 4.2–5.8)
RBC # BLD: 5.26 M/UL — SIGNIFICANT CHANGE UP (ref 4.2–5.8)
RBC # FLD: 13.2 % — SIGNIFICANT CHANGE UP (ref 10.3–14.5)
RBC # FLD: 13.2 % — SIGNIFICANT CHANGE UP (ref 10.3–14.5)
SODIUM SERPL-SCNC: 140 MMOL/L — SIGNIFICANT CHANGE UP (ref 135–145)
SODIUM SERPL-SCNC: 140 MMOL/L — SIGNIFICANT CHANGE UP (ref 135–145)
TRIGL SERPL-MCNC: 76 MG/DL — SIGNIFICANT CHANGE UP
TRIGL SERPL-MCNC: 76 MG/DL — SIGNIFICANT CHANGE UP
WBC # BLD: 12.34 K/UL — HIGH (ref 3.8–10.5)
WBC # BLD: 12.34 K/UL — HIGH (ref 3.8–10.5)
WBC # FLD AUTO: 12.34 K/UL — HIGH (ref 3.8–10.5)
WBC # FLD AUTO: 12.34 K/UL — HIGH (ref 3.8–10.5)

## 2023-12-01 PROCEDURE — 93010 ELECTROCARDIOGRAM REPORT: CPT

## 2023-12-01 PROCEDURE — 99233 SBSQ HOSP IP/OBS HIGH 50: CPT

## 2023-12-01 PROCEDURE — 99239 HOSP IP/OBS DSCHRG MGMT >30: CPT

## 2023-12-01 PROCEDURE — 93306 TTE W/DOPPLER COMPLETE: CPT | Mod: 26

## 2023-12-01 RX ORDER — DORZOLAMIDE HYDROCHLORIDE 20 MG/ML
1 SOLUTION/ DROPS OPHTHALMIC
Refills: 0 | DISCHARGE

## 2023-12-01 RX ORDER — TICAGRELOR 90 MG/1
1 TABLET ORAL
Qty: 60 | Refills: 0
Start: 2023-12-01 | End: 2023-12-30

## 2023-12-01 RX ORDER — METOPROLOL TARTRATE 50 MG
1 TABLET ORAL
Qty: 60 | Refills: 0
Start: 2023-12-01 | End: 2023-12-30

## 2023-12-01 RX ORDER — ASPIRIN/CALCIUM CARB/MAGNESIUM 324 MG
4 TABLET ORAL
Refills: 0 | DISCHARGE

## 2023-12-01 RX ORDER — LISINOPRIL 2.5 MG/1
1 TABLET ORAL
Qty: 30 | Refills: 0
Start: 2023-12-01 | End: 2023-12-30

## 2023-12-01 RX ORDER — METOPROLOL TARTRATE 50 MG
0.5 TABLET ORAL
Qty: 30 | Refills: 0
Start: 2023-12-01 | End: 2023-12-30

## 2023-12-01 RX ORDER — ASPIRIN/CALCIUM CARB/MAGNESIUM 324 MG
4 TABLET ORAL
Qty: 1 | Refills: 0
Start: 2023-12-01 | End: 2023-12-30

## 2023-12-01 RX ORDER — ATORVASTATIN CALCIUM 80 MG/1
1 TABLET, FILM COATED ORAL
Qty: 30 | Refills: 0
Start: 2023-12-01 | End: 2023-12-30

## 2023-12-01 RX ORDER — METOPROLOL TARTRATE 50 MG
12.5 TABLET ORAL DAILY
Refills: 0 | Status: DISCONTINUED | OUTPATIENT
Start: 2023-12-01 | End: 2023-12-01

## 2023-12-01 RX ORDER — ASPIRIN/CALCIUM CARB/MAGNESIUM 324 MG
1 TABLET ORAL
Qty: 1 | Refills: 0
Start: 2023-12-01 | End: 2023-12-30

## 2023-12-01 RX ORDER — TICAGRELOR 90 MG/1
1 TABLET ORAL
Qty: 6 | Refills: 0
Start: 2023-12-01 | End: 2023-12-06

## 2023-12-01 RX ADMIN — LISINOPRIL 5 MILLIGRAM(S): 2.5 TABLET ORAL at 09:59

## 2023-12-01 RX ADMIN — DORZOLAMIDE HYDROCHLORIDE 1 DROP(S): 20 SOLUTION/ DROPS OPHTHALMIC at 10:00

## 2023-12-01 RX ADMIN — TICAGRELOR 90 MILLIGRAM(S): 90 TABLET ORAL at 09:59

## 2023-12-01 RX ADMIN — Medication 81 MILLIGRAM(S): at 09:58

## 2023-12-01 NOTE — DISCHARGE NOTE PROVIDER - NSDCCPCAREPLAN_GEN_ALL_CORE_FT
PRINCIPAL DISCHARGE DIAGNOSIS  Diagnosis: NSTEMI (non-ST elevation myocardial infarction)  Assessment and Plan of Treatment: - Encourage PO fluids  - ASA 81  - Brilinta 90mg BID  - Lisinopril 5mg  - Lopressor 25mg  - Lipitor 80  - Pt. qualifies for cardiac rehab, educational material provided to patient pt. declines   -Discussed therapeutic lifestyle changes to reduce risk factors such as following a cardiac diet, weight loss, maintaining a healthy weight, exercise, smoking cessation, medication compliance, and regular follow-up  with MD to know our numbers (BP, cholesterol, weight, and glucose     PRINCIPAL DISCHARGE DIAGNOSIS  Diagnosis: NSTEMI (non-ST elevation myocardial infarction)  Assessment and Plan of Treatment: - Encourage PO fluids  - ASA 81  - Brilinta 90mg BID  - Lisinopril 5mg  - Lopressor 25mg  - Lipitor 80  - Pt. qualifies for cardiac rehab, educational material provided to patient pt. declines   -Discussed therapeutic lifestyle changes to reduce risk factors such as following a cardiac diet, weight loss, maintaining a healthy weight, exercise, smoking cessation, medication compliance, and regular follow-up  with MD to know our numbers (BP, cholesterol, weight, and glucose).   Close follow up with Dr. Esteban      SECONDARY DISCHARGE DIAGNOSES  Diagnosis: Acute renal failure  Assessment and Plan of Treatment: - Scr -  1.37  - maintain good hdyration  - this needs a close follow up with your primary care physician   - close follow up with Primary care physician - Dr. Faulkner    Diagnosis: Elevated WBCs  Assessment and Plan of Treatment: - this is most likely stress related  - close follow up with your primary care physician     As per son h/o copd.  Son requesting long term placement.  Oriented to person.  EKG/FS/CORE requested in main ED

## 2023-12-01 NOTE — PROGRESS NOTE ADULT - ASSESSMENT
64 yo Male with no significant PMHX presented with chest pain after a Yoga session. Pain started in the right jaw area, then moved to the right side of the chest with diaphoresis. His pain lasted from 7:30 pm to 11:30 pm last night. No prior h/o of any CAD. His father had CABG x4 , when he was 80 years old. No other complain, Patient is pain free now,  (30 Nov 2023 04:57)            -Encourage PO fluids  -ASA 81  -Brilinta 90mg BID  -Lisinopril 5mg  -Lopressor 25mg  -Lipitor 80  -Pt. qualifies for cardiac rehab, educational material provided to patient pt. declines   -Plan of care D/W pt. and MD  -Discussed therapeutic lifestyle changes to reduce risk factors such as following a cardiac diet, weight loss, maintaining a healthy weight, exercise, smoking cessation, medication compliance, and regular follow-up  with MD to know our numbers (BP, cholesterol, weight, and glucose  -If pt. remains stable overnight possible D/C in AM  - Follow-up AM labs/EKG/site check  -Follow-up with attending/cardiologist       62 yo Male with no significant PMHX presented with chest pain after a Yoga session. Pain started in the right jaw area, then moved to the right side of the chest with diaphoresis. His father had CABG x4 , when he was 80 years old.  Trop 1758.82->34247.98->07014.92 +NSTEMI S/P LHC S/P KARMEN     -Encourage PO fluids  -ASA 81  -Brilinta 90mg BID  -Lisinopril 5mg  -Lopressor 25mg  -Lipitor 80  -Pt. qualifies for cardiac rehab, educational material provided to patient pt. declines   -Plan of care D/W pt. and MD  -Discussed therapeutic lifestyle changes to reduce risk factors such as following a cardiac diet, weight loss, maintaining a healthy weight, exercise, smoking cessation, medication compliance, and regular follow-up  with MD to know our numbers (BP, cholesterol, weight, and glucose  -Follow-up with attending/cardiologist

## 2023-12-01 NOTE — DISCHARGE NOTE NURSING/CASE MANAGEMENT/SOCIAL WORK - NSDCFUADDAPPT_GEN_ALL_CORE_FT
Close follow up with cardiology    FOLLOW UP APPOINTMENT with Dr. Faulkner on Tuesday, December 5th, 2023 at 11:45am [phone].

## 2023-12-01 NOTE — PROGRESS NOTE ADULT - NS ATTEND AMEND GEN_ALL_CORE FT
Agree with the above. CAD/NSTEMI s/p PCI to ramus   Stable for DC from cardiac standpoint. will f/u with Dr. Esteban

## 2023-12-01 NOTE — DISCHARGE NOTE NURSING/CASE MANAGEMENT/SOCIAL WORK - PATIENT PORTAL LINK FT
You can access the FollowMyHealth Patient Portal offered by [facility] by registering at the following website: [email or web address]. By joining [employer]s FollowMyHealth portal, you will also be able to view your health information using other applications (apps) compatible with our system.

## 2023-12-01 NOTE — DISCHARGE NOTE NURSING/CASE MANAGEMENT/SOCIAL WORK - NSDCPEFALRISK_GEN_ALL_CORE
For information on Fall & Injury Prevention, visit: [email or web address] OR  [email or web address] OR  https://www.cdc.gov/steadi/patient.html

## 2023-12-01 NOTE — PROGRESS NOTE ADULT - SUBJECTIVE AND OBJECTIVE BOX
NP Progress Note     Follow Up:  Consult    HPI:  64 yo Male with no significant PMHX presented with chest pain after a Yoga session. Pain started in the right jaw area, then moved to the right side of the chest with diaphoresis. His pain lasted from 7:30 pm to 11:30 pm last night. No prior h/o of any CAD. His father had CABG x4 , when he was 80 years old. No other complain, Patient is pain free now,  (30 Nov 2023 04:57)        Subjective/Observations: Pt. seen and examined and evaluated. Pt. resting comfortably in bed in NAD, with no respiratory distress, no chest pain, dyspnea, palpitations, PND, or orthopnea.    REVIEW OF SYSTEMS: All other review of systems is negative unless indicated above    PAST MEDICAL & SURGICAL HISTORY:  No pertinent past medical history      No significant past surgical history          MEDICATIONS  (STANDING):  aspirin enteric coated 81 milliGRAM(s) Oral daily  atorvastatin 80 milliGRAM(s) Oral at bedtime  dorzolamide 2% Ophthalmic Solution 1 Drop(s) Both EYES <User Schedule>  lisinopril 5 milliGRAM(s) Oral daily  metoprolol tartrate 25 milliGRAM(s) Oral two times a day  sodium chloride 0.9%. 1000 milliLiter(s) (250 mL/Hr) IV Continuous <Continuous>  sodium chloride 0.9%. 1000 milliLiter(s) (200 mL/Hr) IV Continuous <Continuous>  ticagrelor 90 milliGRAM(s) Oral every 12 hours    MEDICATIONS  (PRN):  acetaminophen     Tablet .. 650 milliGRAM(s) Oral every 6 hours PRN Temp greater or equal to 38C (100.4F), Mild Pain (1 - 3)  aluminum hydroxide/magnesium hydroxide/simethicone Suspension 30 milliLiter(s) Oral every 4 hours PRN Dyspepsia  melatonin 3 milliGRAM(s) Oral at bedtime PRN Insomnia  ondansetron Injectable 4 milliGRAM(s) IV Push every 8 hours PRN Nausea and/or Vomiting      Allergies    No Known Allergies    Intolerances          Vital Signs Last 24 Hrs  T(C): 36.7 (30 Nov 2023 20:58), Max: 36.7 (30 Nov 2023 20:58)  T(F): 98 (30 Nov 2023 20:58), Max: 98 (30 Nov 2023 20:58)  HR: 56 (30 Nov 2023 20:58) (54 - 66)  BP: 138/88 (30 Nov 2023 20:58) (117/67 - 167/82)  BP(mean): 99 (30 Nov 2023 17:03) (96 - 99)  RR: 18 (30 Nov 2023 20:58) (16 - 19)  SpO2: 98% (30 Nov 2023 20:58) (95% - 99%)    Parameters below as of 30 Nov 2023 20:58  Patient On (Oxygen Delivery Method): room air        I&O's Summary    30 Nov 2023 07:01  -  01 Dec 2023 07:00  --------------------------------------------------------  IN: 1000 mL / OUT: 700 mL / NET: 300 mL      Weight (kg): 81.6 (11-30 @ 08:08)        LABS: All Labs Reviewed:                        15.0   11.45 )-----------( 258      ( 30 Nov 2023 06:30 )             44.6                         16.2   13.68 )-----------( 273      ( 29 Nov 2023 22:07 )             48.4     30 Nov 2023 06:30    142    |  109    |  19     ----------------------------<  112    3.8     |  25     |  1.20   29 Nov 2023 22:07    143    |  108    |  24     ----------------------------<  121    4.1     |  28     |  1.23     Ca    8.6        30 Nov 2023 06:30  Ca    9.0        29 Nov 2023 22:07  Mg     2.2       30 Nov 2023 06:30  Mg     2.5       29 Nov 2023 22:07    TPro  7.4    /  Alb  3.6    /  TBili  1.1    /  DBili  x      /  AST  33     /  ALT  29     /  AlkPhos  60     29 Nov 2023 22:07    PT/INR - ( 29 Nov 2023 22:07 )   PT: 10.5 sec;   INR: 0.93 ratio         PTT - ( 30 Nov 2023 06:30 )  PTT:63.7 sec           Echo:    Cath:    EKG:     Interpretation of Telemetry:      Physical Exam:  Appearance: [ ] Normal  [ ] abnormal [ ] NAD   Eyes: [ ] PERRL [ ] EOMI  HEENT: [ ] Normal [ ] Abnormal oral mucosa [ ]NC/AT  Cardiovascular: [ ] S1 [ ] S2 [ ] RRR [ ] m/r/g [ ]edema [ ] JVP  Procedural Access Site: [ ]  hematoma [ ] tender to palpation [ ] 2+ pulse [ ] bruit [ ] Ecchymosis  Respiratory: [ ] Clear to auscultation bilaterally  Gastrointestinal: [ ] Soft [ ] tenderness[ ] distension [ ] BS  Musculoskeletal: [ ] clubbing [ ] joint deformity   Neurologic: [ ] Non-focal  Lymphatic: [ ] lymphadenopathy  Psychiatry: [ ] AAOx3  [ ] confused [ ] disoriented [ ] Mood & affect appropriate  Skin: [ ]  rashes [ ] ecchymoses [ ] cyanosis   NP Progress Note     Follow Up: S/P C S/P KARMEN     HPI:  64 yo Male with no significant PMHX presented with chest pain after a Yoga session. Pain started in the right jaw area, then moved to the right side of the chest with diaphoresis. His pain lasted from 7:30 pm to 11:30 pm last night. No prior h/o of any CAD. His father had CABG x4 , when he was 80 years old. No other complain, Patient is pain free now,  (30 Nov 2023 04:57)  Trop 1758.82->74093.98->98141.92 +NSTEMI Pt. referred for [facility] for further evaluation     Subjective/Observations: Pt. seen and examined and evaluated. Pt. resting comfortably in bed in NAD, with no respiratory distress, no chest pain, dyspnea, palpitations, PND, or orthopnea.    REVIEW OF SYSTEMS: All other review of systems is negative unless indicated above      MEDICATIONS  (STANDING):  aspirin enteric coated 81 milliGRAM(s) Oral daily  atorvastatin 80 milliGRAM(s) Oral at bedtime  dorzolamide 2% Ophthalmic Solution 1 Drop(s) Both EYES <User Schedule>  lisinopril 5 milliGRAM(s) Oral daily  metoprolol tartrate 25 milliGRAM(s) Oral two times a day  sodium chloride 0.9%. 1000 milliLiter(s) (250 mL/Hr) IV Continuous <Continuous>  sodium chloride 0.9%. 1000 milliLiter(s) (200 mL/Hr) IV Continuous <Continuous>  ticagrelor 90 milliGRAM(s) Oral every 12 hours    MEDICATIONS  (PRN):  acetaminophen     Tablet .. 650 milliGRAM(s) Oral every 6 hours PRN Temp greater or equal to 38C (100.4F), Mild Pain (1 - 3)  aluminum hydroxide/magnesium hydroxide/simethicone Suspension 30 milliLiter(s) Oral every 4 hours PRN Dyspepsia  melatonin 3 milliGRAM(s) Oral at bedtime PRN Insomnia  ondansetron Injectable 4 milliGRAM(s) IV Push every 8 hours PRN Nausea and/or Vomiting      Allergies: No Known Allergies    Vital Signs Last 24 Hrs  T(C): 36.7 (30 Nov 2023 20:58), Max: 36.7 (30 Nov 2023 20:58)  T(F): 98 (30 Nov 2023 20:58), Max: 98 (30 Nov 2023 20:58)  HR: 56 (30 Nov 2023 20:58) (54 - 66)  BP: 138/88 (30 Nov 2023 20:58) (117/67 - 167/82)  BP(mean): 99 (30 Nov 2023 17:03) (96 - 99)  RR: 18 (30 Nov 2023 20:58) (16 - 19)  SpO2: 98% (30 Nov 2023 20:58) (95% - 99%)    Parameters below as of 30 Nov 2023 20:58  Patient On (Oxygen Delivery Method): room air        I&O's Summary    30 Nov 2023 07:01  -  01 Dec 2023 07:00  --------------------------------------------------------  IN: 1000 mL / OUT: 700 mL / NET: 300 mL      Weight (kg): 81.6 (11-30 @ 08:08)        LABS: All Labs Reviewed:                        15.0   11.45 )-----------( 258      ( 30 Nov 2023 06:30 )             44.6            30 Nov 2023 06:30    142    |  109    |  19     ----------------------------<  112    3.8     |  25     |  1.20     Ca    8.6        30 Nov 2023 06:30  Mg     2.2       30 Nov 2023 06:30    Echo: Official report pending     Cath:  < from: Cardiac Catheterization (11.30.23 @ 08:30) >  Conclusions:   Successful KARMEN of a long, complex ramus stenosis.  Of note, LAD arises  anomalously from the right coronary cusp    EKG:     Interpretation of Telemetry:      Physical Exam:  Appearance: [ ] Normal  [ ] abnormal [X ] NAD   Eyes: [ ] PERRL [ ] EOMI  HEENT: [ ] Normal [ ] Abnormal oral mucosa [ ]NC/AT  Cardiovascular: [X ] S1 [X ] S2 [ ] RRR [ ] m/r/g [ ]edema [ ] JVP  Procedural Access Site: [X ] RT. radial pressure dressing removed site benign soft no bleeding no hematoma +1 radial pulse bandaid applied to site no c/o numbness/tingling, <3sec cap refill, fingers/hand warm to touch    Respiratory: [X ] Clear to auscultation bilaterally  Gastrointestinal: [ ] Soft [ ] tenderness[ ] distension [ ] BS  Musculoskeletal: [ ] clubbing [ ] joint deformity   Neurologic: [ ] Non-focal  Lymphatic: [ ] lymphadenopathy  Psychiatry: [X ] AAOx3  [ ] confused [ ] disoriented [ ] Mood & affect appropriate  Skin: [ ]  rashes [ ] ecchymoses [ ] cyanosis   NP Progress Note     Follow Up: S/P C S/P KARMEN     HPI:  64 yo Male with no significant PMHX presented with chest pain after a Yoga session. Pain started in the right jaw area, then moved to the right side of the chest with diaphoresis. His pain lasted from 7:30 pm to 11:30 pm last night. No prior h/o of any CAD. His father had CABG x4 , when he was 80 years old. No other complain, Patient is pain free now,  (30 Nov 2023 04:57)  Trop 1758.82->89256.98->58275.92 +NSTEMI Pt. referred for [facility] for further evaluation     Subjective/Observations: Pt. seen and examined and evaluated. Pt. resting comfortably in bed in NAD, with no respiratory distress, no chest pain, dyspnea, palpitations, PND, or orthopnea.    REVIEW OF SYSTEMS: All other review of systems is negative unless indicated above      MEDICATIONS  (STANDING):  aspirin enteric coated 81 milliGRAM(s) Oral daily  atorvastatin 80 milliGRAM(s) Oral at bedtime  dorzolamide 2% Ophthalmic Solution 1 Drop(s) Both EYES <User Schedule>  lisinopril 5 milliGRAM(s) Oral daily  metoprolol tartrate 25 milliGRAM(s) Oral two times a day  sodium chloride 0.9%. 1000 milliLiter(s) (250 mL/Hr) IV Continuous <Continuous>  sodium chloride 0.9%. 1000 milliLiter(s) (200 mL/Hr) IV Continuous <Continuous>  ticagrelor 90 milliGRAM(s) Oral every 12 hours    MEDICATIONS  (PRN):  acetaminophen     Tablet .. 650 milliGRAM(s) Oral every 6 hours PRN Temp greater or equal to 38C (100.4F), Mild Pain (1 - 3)  aluminum hydroxide/magnesium hydroxide/simethicone Suspension 30 milliLiter(s) Oral every 4 hours PRN Dyspepsia  melatonin 3 milliGRAM(s) Oral at bedtime PRN Insomnia  ondansetron Injectable 4 milliGRAM(s) IV Push every 8 hours PRN Nausea and/or Vomiting      Allergies: No Known Allergies    Vital Signs Last 24 Hrs  T(C): 36.7 (30 Nov 2023 20:58), Max: 36.7 (30 Nov 2023 20:58)  T(F): 98 (30 Nov 2023 20:58), Max: 98 (30 Nov 2023 20:58)  HR: 56 (30 Nov 2023 20:58) (54 - 66)  BP: 138/88 (30 Nov 2023 20:58) (117/67 - 167/82)  BP(mean): 99 (30 Nov 2023 17:03) (96 - 99)  RR: 18 (30 Nov 2023 20:58) (16 - 19)  SpO2: 98% (30 Nov 2023 20:58) (95% - 99%)    Parameters below as of 30 Nov 2023 20:58  Patient On (Oxygen Delivery Method): room air        I&O's Summary    30 Nov 2023 07:01  -  01 Dec 2023 07:00  --------------------------------------------------------  IN: 1000 mL / OUT: 700 mL / NET: 300 mL      Weight (kg): 81.6 (11-30 @ 08:08)        LABS: All Labs Reviewed:                        15.0   11.45 )-----------( 258      ( 30 Nov 2023 06:30 )             44.6            30 Nov 2023 06:30    142    |  109    |  19     ----------------------------<  112    3.8     |  25     |  1.20     Ca    8.6        30 Nov 2023 06:30  Mg     2.2       30 Nov 2023 06:30    Echo: Official report pending     Cath:  < from: Cardiac Catheterization (11.30.23 @ 08:30) >  Conclusions:   Successful KARMEN of a long, complex ramus stenosis.  Of note, LAD arises  anomalously from the right coronary cusp    EKG: SR 1st AV block @ 53bpm    Interpretation of Telemetry: SR @ 40-80 BPM       Physical Exam:  Appearance: [ ] Normal  [ ] abnormal [X ] NAD   Eyes: [ ] PERRL [ ] EOMI  HEENT: [ ] Normal [ ] Abnormal oral mucosa [ ]NC/AT  Cardiovascular: [X ] S1 [X ] S2 [ ] RRR [ ] m/r/g [ ]edema [ ] JVP  Procedural Access Site: [X ] RT. radial pressure dressing removed site benign soft no bleeding no hematoma +1 radial pulse bandaid applied to site no c/o numbness/tingling, <3sec cap refill, fingers/hand warm to touch    Respiratory: [X ] Clear to auscultation bilaterally  Gastrointestinal: [ ] Soft [ ] tenderness[ ] distension [ ] BS  Musculoskeletal: [ ] clubbing [ ] joint deformity   Neurologic: [ ] Non-focal  Lymphatic: [ ] lymphadenopathy  Psychiatry: [X ] AAOx3  [ ] confused [ ] disoriented [ ] Mood & affect appropriate  Skin: [ ]  rashes [ ] ecchymoses [ ] cyanosis

## 2023-12-01 NOTE — DISCHARGE NOTE PROVIDER - HOSPITAL COURSE
Admission Dx: NSTEMI    64 yo Male with no significant PMHX presented with chest pain after a Yoga session. Pain started in the right jaw area, then moved to the right side of the chest with diaphoresis. His pain lasted from 7:30 pm to 11:30 pm last night. No prior h/o of any CAD. His father had CABG x4 , when he was 80 years old. No other complain, Patient is pain free now,  (30 Nov 2023 04:57)             Admission Dx: NSTEMI    62 yo Male with no significant PMHX presented with chest pain after a Yoga session. Pain started in the right jaw area, then moved to the right side of the chest with diaphoresis. His pain lasted from 7:30 pm to 11:30 pm last night. No prior h/o of any CAD. His father had CABG x4 , when he was 80 years old. No other complain, Patient is pain free now,  (30 Nov 2023 04:57)    Physical Exam:   GENERAL APPEARANCE:  NAD, hemodynamically stable  T(C): 36.7 (11-30-23 @ 20:58), Max: 36.7 (11-30-23 @ 20:58)  HR: 56 (11-30-23 @ 20:58) (54 - 66)  BP: 138/88 (11-30-23 @ 20:58) (117/67 - 167/82)  RR: 18 (11-30-23 @ 20:58) (16 - 18)  SpO2: 98% (11-30-23 @ 20:58) (96% - 99%)  Wt(kg): --  HEENT:  Head is normocephalic    Skin:  Warm and dry without any rash   NECK:  Supple without lymphadenopathy.   HEART:  Regular rate and rhythm. normal S1 and S2, No M/R/G  LUNGS:  Good ins/exp effort, no W/R/R/C  ABDOMEN:  Soft, nontender, nondistended with good bowel sounds heard  EXTREMITIES:  Without cyanosis, clubbing or edema.   NEUROLOGICAL:  Gross nonfocal              Admission Dx: NSTEMI    Patient is a 62 yo Male with no significant PMHX presented with chest pain after a Yoga session. Pain started in the right jaw area, then moved to the right side of the chest with diaphoresis. His pain lasted from 7:30 pm to 11:30 pm last night. No prior h/o of any CAD. His father had CABG x4 , when he was 80 years old. No other complain, Patient is pain free now,  (30 Nov 2023 04:57)    Comfortable. Labs and vitals reviewed.     Physical Exam:   GENERAL APPEARANCE:  NAD, hemodynamically stable  T(C): 36.7 (11-30-23 @ 20:58), Max: 36.7 (11-30-23 @ 20:58)  HR: 56 (11-30-23 @ 20:58) (54 - 66)  BP: 138/88 (11-30-23 @ 20:58) (117/67 - 167/82)  RR: 18 (11-30-23 @ 20:58) (16 - 18)  HEENT:  Head is normocephalic    Skin:  Warm and dry without any rash   NECK:  Supple without lymphadenopathy.   HEART:  Regular rate and rhythm. normal S1 and S2, No M/R/G  LUNGS:  Good ins/exp effort, no W/R/R/C  ABDOMEN:  Soft, nontender, nondistended with good bowel sounds heard  EXTREMITIES:  Without cyanosis, clubbing or edema.   NEUROLOGICAL:  Gross nonfocal              Admission Dx: NSTEMI    Patient is a 62 yo Male with no significant PMHX presented with chest pain after a Yoga session. Pain started in the right jaw area, then moved to the right side of the chest with diaphoresis. His pain lasted from 7:30 pm to 11:30 pm last night. No prior h/o of any CAD. His father had CABG x4 , when he was 80 years old. No other complain, Patient is pain free now,  (30 Nov 2023 04:57)    Comfortable. Denies any HA, CP, SOB. No fevers, chills or shakes. Ambulating in the hallways without any difficulties.  Labs and vitals reviewed.     Physical Exam:   GENERAL APPEARANCE:  NAD, hemodynamically stable  T(C): 36.7 (11-30-23 @ 20:58), Max: 36.7 (11-30-23 @ 20:58)  HR: 56 (11-30-23 @ 20:58) (54 - 66)  BP: 138/88 (11-30-23 @ 20:58) (117/67 - 167/82)  RR: 18 (11-30-23 @ 20:58) (16 - 18)  HEENT:  Head is normocephalic    Skin:  Warm and dry without any rash   NECK:  Supple without lymphadenopathy.   HEART:  Regular rate and rhythm. normal S1 and S2, No M/R/G  LUNGS:  Good ins/exp effort, no W/R/R/C  ABDOMEN:  Soft, nontender, nondistended with good bowel sounds heard  EXTREMITIES:  Without cyanosis, clubbing or edema.   NEUROLOGICAL:  Gross nonfocal

## 2023-12-01 NOTE — PROGRESS NOTE ADULT - PROBLEM SELECTOR PLAN 1
s/p LHC with successful KARMEN to RAMUS, TTE normal   Recommendation: cont. DAPT with asa and birllinta, GDMT with ACEI, BB - dose decreased to toprol xl 12.5 mg po daily due to mild bradycardia and statin, followup with cardiology - Dr. Esteban - within one week     pt. is stable from cardiac standpoint, will sign off

## 2023-12-01 NOTE — DISCHARGE NOTE PROVIDER - NSDCMRMEDTOKEN_GEN_ALL_CORE_FT
aspirin 81 mg oral delayed release tablet: 4 tab(s) orally once , given PTA.  dorzolamide 2% ophthalmic solution: 1 drop(s) in each eye 2 times a day   aspirin 81 mg oral delayed release tablet: 4 tab(s) orally once , given PTA.  atorvastatin 80 mg oral tablet: 1 tab(s) orally once a day (at bedtime)  lisinopril 5 mg oral tablet: 1 tab(s) orally once a day  metoprolol tartrate 25 mg oral tablet: 1 tab(s) orally 2 times a day  ticagrelor 90 mg oral tablet: 1 tab(s) orally every 12 hours   aspirin 81 mg oral delayed release tablet: 1 tab(s) orally once , given PTA.  atorvastatin 80 mg oral tablet: 1 tab(s) orally once a day (at bedtime)  lisinopril 5 mg oral tablet: 1 tab(s) orally once a day  metoprolol tartrate 25 mg oral tablet: 0.5 tab(s) orally 2 times a day  ticagrelor 90 mg oral tablet: 1 tab(s) orally every 12 hours

## 2023-12-01 NOTE — PROGRESS NOTE ADULT - SUBJECTIVE AND OBJECTIVE BOX
CHIEF COMPLAINT: Patient is a 63y old  Male who presents with a chief complaint of jaw, shoulder pain    HPI:  63 year old man with a history of pulmonary embolism (2/2023; treated with Xarelto x 3 months) and glaucoma presented to the ER after 1st seeking care at an urgent care center.  He describes the abrupt onset of indigestion after dinner followed by right shoulder and right jaw pain associated with diaphoresis during a yoga class; no clear exacerbating or alleviating factors.  He reports no history of heart disease and excellent baseline functional status / exercise capacity. In the the ED was diagnosed with a NSTEMI and treated with aspirin, Plavix, UFH.    12/1/23: no complaints, s/p LHC with KARMEN to RAMUS     MEDICATIONS  (STANDING):  aspirin enteric coated 81 milliGRAM(s) Oral daily  atorvastatin 80 milliGRAM(s) Oral at bedtime  dorzolamide 2% Ophthalmic Solution 1 Drop(s) Both EYES <User Schedule>  lisinopril 5 milliGRAM(s) Oral daily  metoprolol succinate ER 12.5 milliGRAM(s) Oral daily  sodium chloride 0.9%. 1000 milliLiter(s) (200 mL/Hr) IV Continuous <Continuous>  sodium chloride 0.9%. 1000 milliLiter(s) (250 mL/Hr) IV Continuous <Continuous>  ticagrelor 90 milliGRAM(s) Oral every 12 hours    MEDICATIONS  (PRN):  acetaminophen     Tablet .. 650 milliGRAM(s) Oral every 6 hours PRN Temp greater or equal to 38C (100.4F), Mild Pain (1 - 3)  aluminum hydroxide/magnesium hydroxide/simethicone Suspension 30 milliLiter(s) Oral every 4 hours PRN Dyspepsia  heparin   Injectable 5000 Unit(s) IV Push every 6 hours PRN For aPTT less than 40  melatonin 3 milliGRAM(s) Oral at bedtime PRN Insomnia  ondansetron Injectable 4 milliGRAM(s) IV Push every 8 hours PRN Nausea and/or Vomiting    Vital Signs Last 24 Hrs  T(C): 36.6 (01 Dec 2023 09:07), Max: 36.7 (30 Nov 2023 20:58)  T(F): 97.8 (01 Dec 2023 09:07), Max: 98 (30 Nov 2023 20:58)  HR: 52 (01 Dec 2023 09:07) (52 - 66)  BP: 103/57 (01 Dec 2023 09:07) (103/57 - 167/82)  BP(mean): 67 (01 Dec 2023 09:07) (67 - 99)  RR: 18 (01 Dec 2023 09:07) (16 - 18)  SpO2: 98% (01 Dec 2023 09:07) (97% - 99%)    Parameters below as of 01 Dec 2023 09:07  Patient On (Oxygen Delivery Method): room air    PHYSICAL EXAM:  Constitutional: NAD, awake and alert  HEENT:  EOMI,  Pupils round, No oral cyanosis. Wearing eyeglasses  Pulmonary: Non-labored, breath sounds are clear bilaterally, No wheezing, rales or rhonchi  Cardiovascular: S1 and S2, regular rate and rhythm, no Murmurs, gallops or rubs  Gastrointestinal: Bowel Sounds present, soft, nontender.   Lymph: No peripheral edema. No cervical lymphadenopathy.  Neurological: Alert, no focal deficits  Skin: No rashes.  Psych:  Mood & affect appropriate    LABS:  reviewed                 15.0   11.45 )-----------( 258      ( 30 Nov 2023 06:30 )             44.6          142    |  109    |  19     ----------------------------<  112    3.8     |  25     |  1.20     Ca    8.6        30 Nov 2023 06:30  Mg     2.2       30 Nov 2023 06:30    TPro  7.4    /  Alb  3.6    /  TBili  1.1    /  DBili  x      /  AST  33     /  ALT  29     /  AlkPhos  60     29 Nov 2023 22:07    PT/INR - ( 29 Nov 2023 22:07 )   PT: 10.5 sec;   INR: 0.93 ratio    PTT - ( 30 Nov 2023 06:30 )  PTT:63.7 sec    TroponinI hsT: <-96719.92, <-38551.98, <-1758.82    Radiology/EKG/TTE/LHC: reviewed     ECG: NSR    < from: Cardiac Catheterization (11.30.23 @ 08:30) >    Interventional Details   Ramus intermedius: This was a 99 % [place] stenosis. The lesion length  was 26 mm. This was an ACC/AHAHigh/C lesion for  intervention. This is the culprit lesion. Guidewire crossing was  successful.    < end of copied text >    --------------------------------------------------------------------------------------------    < from: TTE Echo Complete w/o Contrast w/ Doppler (02.03.23 @ 08:34) >   Impression     Summary     The left ventricle is normal in size, wall thickness, wall motion and   contractility.   Estimated left ventricular ejection fraction is 55-60 %.   The right ventricle is normal in size, wall thickness, wall motion, and   contractility based on TAPSE and tissue doppler.   Normal aortic valve structure.   Mild (1+) aortic regurgitation is present.   Normal appearing tricuspid valve structure and function.   Trace tricuspid valve regurgitation is present.     Signature     ----------------------------------------------------------------   Electronically signed by Eagle Horowitz MD(Interpreting   physician) on 02/03/2023 09:39 AM   ----------------------------------------------------------------    < end of copied text >    -----------------------------------------------------------------------------------------------------------------

## 2023-12-01 NOTE — DISCHARGE NOTE PROVIDER - NSDCFUSCHEDAPPT_GEN_ALL_CORE_FT
Michel Esteban  [facility] Physician [facility]  CARDIOLOGY [address]  Scheduled Appointment: 12/06/2023

## 2023-12-02 ENCOUNTER — TRANSCRIPTION ENCOUNTER (OUTPATIENT)
Age: 63
End: 2023-12-02

## 2023-12-04 ENCOUNTER — TRANSCRIPTION ENCOUNTER (OUTPATIENT)
Age: 63
End: 2023-12-04

## 2023-12-04 NOTE — POST DISCHARGE NOTE - DETAILS:
Post procedure phone call completed; patient understood all discharge paperwork. No questions regarding medications or pain management. MD follow up appointment made. Patient was able to rest when they were discharged. Patient will recommend [facility], no complaints of hospital stay, satisfied with care. Instructed patient to contact provider with any further questions or concerns. Post procedure phone call completed; patient understood all discharge paperwork. No questions regarding medications or pain management. MD follow up appointment made. Patient was able to rest when they were discharged. Patient will recommend [facility], no complaints of hospital stay, satisfied with care. Instructed patient to contact provider with any further questions or concerns.

## 2023-12-06 ENCOUNTER — APPOINTMENT (OUTPATIENT)
Dept: CARDIOLOGY | Facility: CLINIC | Age: 63
End: 2023-12-06
Payer: MEDICARE

## 2023-12-06 ENCOUNTER — NON-APPOINTMENT (OUTPATIENT)
Age: 63
End: 2023-12-06

## 2023-12-06 ENCOUNTER — TRANSCRIPTION ENCOUNTER (OUTPATIENT)
Age: 63
End: 2023-12-06

## 2023-12-06 VITALS
HEIGHT: 68 IN | BODY MASS INDEX: 27.74 KG/M2 | HEART RATE: 56 BPM | OXYGEN SATURATION: 98 % | DIASTOLIC BLOOD PRESSURE: 82 MMHG | WEIGHT: 183 LBS | SYSTOLIC BLOOD PRESSURE: 114 MMHG

## 2023-12-06 DIAGNOSIS — Z79.82 LONG TERM (CURRENT) USE OF ASPIRIN: ICD-10-CM

## 2023-12-06 DIAGNOSIS — I34.0 NONRHEUMATIC MITRAL (VALVE) INSUFFICIENCY: ICD-10-CM

## 2023-12-06 DIAGNOSIS — Z82.49 FAMILY HISTORY OF ISCHEMIC HEART DISEASE AND OTHER DISEASES OF THE CIRCULATORY SYSTEM: ICD-10-CM

## 2023-12-06 DIAGNOSIS — I21.4 NON-ST ELEVATION (NSTEMI) MYOCARDIAL INFARCTION: ICD-10-CM

## 2023-12-06 DIAGNOSIS — Z86.711 PERSONAL HISTORY OF PULMONARY EMBOLISM: ICD-10-CM

## 2023-12-06 DIAGNOSIS — I25.10 ATHEROSCLEROTIC HEART DISEASE OF NATIVE CORONARY ARTERY WITHOUT ANGINA PECTORIS: ICD-10-CM

## 2023-12-06 DIAGNOSIS — H40.9 UNSPECIFIED GLAUCOMA: ICD-10-CM

## 2023-12-06 DIAGNOSIS — Z87.898 PERSONAL HISTORY OF OTHER SPECIFIED CONDITIONS: ICD-10-CM

## 2023-12-06 DIAGNOSIS — R03.0 ELEVATED BLOOD-PRESSURE READING, WITHOUT DIAGNOSIS OF HYPERTENSION: ICD-10-CM

## 2023-12-06 DIAGNOSIS — D72.829 ELEVATED WHITE BLOOD CELL COUNT, UNSPECIFIED: ICD-10-CM

## 2023-12-06 PROCEDURE — 99214 OFFICE O/P EST MOD 30 MIN: CPT

## 2023-12-06 PROCEDURE — 93000 ELECTROCARDIOGRAM COMPLETE: CPT

## 2023-12-06 RX ORDER — CLOPIDOGREL BISULFATE 75 MG/1
75 TABLET, FILM COATED ORAL DAILY
Qty: 1 | Refills: 3 | Status: ACTIVE | COMMUNITY
Start: 2023-12-06 | End: 1900-01-01

## 2023-12-06 RX ORDER — ASPIRIN 81 MG
81 TABLET, DELAYED RELEASE (ENTERIC COATED) ORAL
Refills: 0 | Status: ACTIVE | COMMUNITY

## 2023-12-06 RX ORDER — CYCLOBENZAPRINE HYDROCHLORIDE 10 MG/1
10 TABLET, FILM COATED ORAL 3 TIMES DAILY
Refills: 0 | Status: DISCONTINUED | COMMUNITY
End: 2023-12-06

## 2023-12-06 RX ORDER — ATORVASTATIN CALCIUM 80 MG/1
80 TABLET, FILM COATED ORAL DAILY
Qty: 90 | Refills: 3 | Status: ACTIVE | COMMUNITY
Start: 1900-01-01 | End: 1900-01-01

## 2023-12-06 RX ORDER — TRAMADOL HYDROCHLORIDE 50 MG/1
50 TABLET, COATED ORAL EVERY 8 HOURS
Refills: 0 | Status: DISCONTINUED | COMMUNITY
End: 2023-12-06

## 2023-12-06 RX ORDER — RIVAROXABAN 15 MG/1
15 TABLET, FILM COATED ORAL
Refills: 0 | Status: DISCONTINUED | COMMUNITY
End: 2023-12-06

## 2023-12-06 RX ORDER — METOPROLOL TARTRATE 25 MG/1
25 TABLET, FILM COATED ORAL TWICE DAILY
Qty: 180 | Refills: 3 | Status: ACTIVE | COMMUNITY
Start: 1900-01-01 | End: 1900-01-01

## 2023-12-06 RX ORDER — TICAGRELOR 90 MG/1
90 TABLET ORAL TWICE DAILY
Refills: 0 | Status: COMPLETED | COMMUNITY
End: 2023-12-06

## 2023-12-08 ENCOUNTER — TRANSCRIPTION ENCOUNTER (OUTPATIENT)
Age: 63
End: 2023-12-08

## 2023-12-10 ENCOUNTER — TRANSCRIPTION ENCOUNTER (OUTPATIENT)
Age: 63
End: 2023-12-10

## 2023-12-12 ENCOUNTER — TRANSCRIPTION ENCOUNTER (OUTPATIENT)
Age: 63
End: 2023-12-12

## 2023-12-16 ENCOUNTER — NON-APPOINTMENT (OUTPATIENT)
Age: 63
End: 2023-12-16

## 2023-12-17 ENCOUNTER — TRANSCRIPTION ENCOUNTER (OUTPATIENT)
Age: 63
End: 2023-12-17

## 2023-12-19 ENCOUNTER — TRANSCRIPTION ENCOUNTER (OUTPATIENT)
Age: 63
End: 2023-12-19

## 2023-12-20 ENCOUNTER — TRANSCRIPTION ENCOUNTER (OUTPATIENT)
Age: 63
End: 2023-12-20

## 2023-12-22 ENCOUNTER — APPOINTMENT (OUTPATIENT)
Dept: INTERNAL MEDICINE | Facility: CLINIC | Age: 63
End: 2023-12-22
Payer: MEDICARE

## 2023-12-22 VITALS
WEIGHT: 174 LBS | OXYGEN SATURATION: 95 % | RESPIRATION RATE: 18 BRPM | TEMPERATURE: 97.8 F | DIASTOLIC BLOOD PRESSURE: 76 MMHG | HEART RATE: 81 BPM | SYSTOLIC BLOOD PRESSURE: 116 MMHG | BODY MASS INDEX: 26.37 KG/M2 | HEIGHT: 68 IN

## 2023-12-22 DIAGNOSIS — Z76.89 PERSONS ENCOUNTERING HEALTH SERVICES IN OTHER SPECIFIED CIRCUMSTANCES: ICD-10-CM

## 2023-12-22 DIAGNOSIS — I26.99 OTHER PULMONARY EMBOLISM W/OUT ACUTE COR PULMONALE: ICD-10-CM

## 2023-12-22 PROCEDURE — 99214 OFFICE O/P EST MOD 30 MIN: CPT

## 2023-12-22 NOTE — REVIEW OF SYSTEMS
[Shortness Of Breath] : no shortness of breath [Wheezing] : no wheezing [Cough] : cough [Dyspnea on Exertion] : not dyspnea on exertion [Negative] : Psychiatric [FreeTextEntry4] : as hpi

## 2023-12-22 NOTE — ASSESSMENT
[FreeTextEntry1] : Mr. MEDINA is here today to establish care. Coronary artery disease: He recently had a acute subendocardial infarction and severe stenosis of ramus coronary artery, ischemic cardiomyopathy, moderate aortic regurgitation.  Status post PCI. He is currently on atorvastatin, Brilinta after he finished Brilinta he will start on Plavix.  On lisinopril and metoprolol. Under the care of cardiology. Patient is currently asymptomatic from cardiac standpoint.  History of pulmonary embolism: Hypercoagulable workup negative.  Patient took anticoagulation for 3 months then discontinued. Obstructive sleep apnea: Currently not using CPAP.  Congestion of nasal sinus, persistent cough: Advised patient Flonase nasal spray bilateral twice a day.  Prescribed benzonatate 100 mg Every 8 Hourly for Dry Cough.  Increase Water Intake. Steam Inhalation 2 Times A Day.

## 2023-12-22 NOTE — PHYSICAL EXAM
[Normal] : normal gait, coordination grossly intact, no focal deficits and deep tendon reflexes were 2+ and symmetric [de-identified] : Erythematous and edematous bilateral nasal mucosa.

## 2023-12-22 NOTE — HEALTH RISK ASSESSMENT
[Monthly or less (1 pt)] : Monthly or less (1 point) [1 or 2 (0 pts)] : 1 or 2 (0 points) [Never (0 pts)] : Never (0 points) [Little interest or pleasure doing things] : 1) Little interest or pleasure doing things [Feeling down, depressed, or hopeless] : 2) Feeling down, depressed, or hopeless [0] : 2) Feeling down, depressed, or hopeless: Not at all (0) [Good] : ~his/her~ current health as good [Very Good] : ~his/her~  mood as very good [Audit-CScore] : 0 [Patient reported colonoscopy was normal] : Patient reported colonoscopy was normal [HIV test declined] : HIV test declined [Hepatitis C test declined] : Hepatitis C test declined [] :  [Fully functional (bathing, dressing, toileting, transferring, walking, feeding)] : Fully functional (bathing, dressing, toileting, transferring, walking, feeding) [Fully functional (using the telephone, shopping, preparing meals, housekeeping, doing laundry, using] : Fully functional and needs no help or supervision to perform IADLs (using the telephone, shopping, preparing meals, housekeeping, doing laundry, using transportation, managing medications and managing finances) [Reports changes in hearing] : Reports no changes in hearing [Reports changes in vision] : Reports no changes in vision [Smoke Detector] : smoke detector [Seat Belt] :  uses seat belt [ColonoscopyDate] : 1/2019 [Never] : Never

## 2023-12-22 NOTE — HISTORY OF PRESENT ILLNESS
[Spouse] : spouse [FreeTextEntry1] : Establish care [de-identified] : Mr. LEON MEDINA is a 63 year male, presents today to establish care.  He is here with his wife. On his medical history patient stated he had a very healthy lifestyle plays different type of sports tennis, pickleball he walks he eats healthy. On 02/23 he had a acute PE, he was on anticoagulation for 3 months saw hematology hypercoagulable workup was negative.  And Eliquis was discontinued. On 11/3/2023 he had heart attack warning acute subendocardial infarction, severe stenosis of the ramus artery had PCI is currently on statin and Brilinta. Patient complaining of.  Cough and cold for last 2 weeks he has been to urgent care twice , viral swab has been negative. He is feeling much better now dry cough which gets worse at night.  Denied any shortness of breath no chest pain or congestion. No change in bowel and bladder habits.

## 2023-12-27 ENCOUNTER — TRANSCRIPTION ENCOUNTER (OUTPATIENT)
Age: 63
End: 2023-12-27

## 2023-12-29 NOTE — POST DISCHARGE NOTE - DETAILS:
30 day post procedure phone call completed;  No questions regarding medications or pain management. Continues to follow up with MD. Patient will continue to recommend [facility], no complaints of hospital stay, satisfied with care. Instructed patient to contact provider with any further questions or concerns. 30 day post procedure phone call completed;  No questions regarding medications or pain management. Continues to follow up with MD. Patient will continue to recommend [facility], no complaints of hospital stay, satisfied with care. Instructed patient to contact provider with any further questions or concerns.

## 2024-01-03 ENCOUNTER — APPOINTMENT (OUTPATIENT)
Dept: CARDIOLOGY | Facility: CLINIC | Age: 64
End: 2024-01-03
Payer: MEDICARE

## 2024-01-03 VITALS — SYSTOLIC BLOOD PRESSURE: 110 MMHG | DIASTOLIC BLOOD PRESSURE: 76 MMHG

## 2024-01-03 VITALS — DIASTOLIC BLOOD PRESSURE: 70 MMHG | SYSTOLIC BLOOD PRESSURE: 122 MMHG

## 2024-01-03 PROCEDURE — 99214 OFFICE O/P EST MOD 30 MIN: CPT

## 2024-01-03 RX ORDER — LISINOPRIL 5 MG/1
5 TABLET ORAL DAILY
Qty: 90 | Refills: 3 | Status: DISCONTINUED | COMMUNITY
Start: 1900-01-01 | End: 2024-01-03

## 2024-01-03 RX ORDER — TICAGRELOR 90 MG/1
90 TABLET ORAL
Refills: 0 | Status: DISCONTINUED | COMMUNITY
End: 2024-01-03

## 2024-01-03 RX ORDER — LATANOPROST/PF 0.005 %
0.01 DROPS OPHTHALMIC (EYE)
Refills: 0 | Status: DISCONTINUED | COMMUNITY
End: 2024-01-03

## 2024-01-03 NOTE — CARDIOLOGY SUMMARY
[de-identified] : 12/6/2023. Sinus rhythm with nonspecific ST and T abnormality [de-identified] : 12/1/2023.  Normal left ventricular size and function with estimated ejection fraction 55%.  Normal right ventricular size and function.  Mild mitral regurgitation.  Mild, eccentric aortic regurgitation.  Mild tricuspid regurgitation. [de-identified] : 11/30/2023.  LAD: Mild atherosclerosis (vessel arises anomalously from the right cusp).  Left circumflex: Minor irregularities.  RCA: Minor irregularities.  First right posterolateral: Small vessel with 80% stenosis in the distal third portion.  Proximal ramus intermedius: 90% stenosis.  Mid ramus intermedius: 90% stenosis.  Ventriculography revealed anterobasal and anterolateral hypokinesis with ejection fraction estimated at 45%.  Successful drug-eluting stent deployment of a long, complex ramus stenosis.

## 2024-01-03 NOTE — DISCUSSION/SUMMARY
[With Me] : with me [___ Week(s)] : in [unfilled] week(s) [FreeTextEntry1] : Coronary artery disease: Doing well status post PCI of a severe ramus intermedius stenosis in the setting of an acute coronary syndrome/subendocardial infarct.  Continue aspirin and plavix.  Continue present doses of metoprolol and atorvastatin.  BP remains well controlled since stopping lisinopril.  Pt has follow up next week with .  Labs done this am, not resulted yet.   Ischemic cardiomyopathy: Mild LV dysfunction in the setting of subendocardial infarct; I plan to reassess LV function in several months.  Pt will continue metoprolol.  Lisinopril stopped due to cough.  Will consider adding ARB if BP elevated.  Aortic/mitral insufficiencies: Mild.  Plan discussed with pt and

## 2024-01-03 NOTE — REVIEW OF SYSTEMS
[Joint Pain] : joint pain [Negative] : Heme/Lymph [SOB] : no shortness of breath [Dyspnea on exertion] : not dyspnea during exertion [Chest Discomfort] : no chest discomfort [Lower Ext Edema] : no extremity edema [Leg Claudication] : no intermittent leg claudication [Palpitations] : no palpitations [Orthopnea] : no orthopnea [PND] : no PND [Syncope] : no syncope [Cough] : cough [Dizziness] : no dizziness [FreeTextEntry6] : see HPI

## 2024-01-03 NOTE — HISTORY OF PRESENT ILLNESS
[FreeTextEntry1] : Jermaine Jack is a 63-year-old man with a history of pulmonary embolism (February 2023), obstructive sleep apnea, lumbar radiculopathy who presents for evaluation of his blood pressure.  He was hospitalized on November 30, 2023 with an acute subendocardial infarction at [facility].  He was found to have a severe stenosis of the ramus coronary artery and is status post KARMEN.  He was discharged on lisinopril, metoprolol, asa and brilinta.  He was unable to afford brilinta which was transitioned to plavix.    Pt contacted our office on 12/28 c/o dry cough which he attributed to lisinopril.  He was advised to stop medication as BP was well controlled and follow up today.  Today he notes that his cough has improved although he recently had URI and has residual cough from that.  His BP at home are well controlled off ACEI ('s).  He denies cp or sob.  He walks 1/2 mild a day without difficulty.

## 2024-01-03 NOTE — PHYSICAL EXAM
[Normal S1, S2] : normal S1, S2 [Clear Lung Fields] : clear lung fields [Normal Gait] : normal gait [No Edema] : no edema [Alert and Oriented] : alert and oriented [Well Developed] : well developed [No Acute Distress] : no acute distress [de-identified] : Overweight [de-identified] : Extraocular muscles are intact [de-identified] : Normocephalic [de-identified] : No JVD [de-identified] : Mild bradycardia, Systolic murmur, 2 right radial pulse [de-identified] : Skin is warm and dry

## 2024-01-09 ENCOUNTER — APPOINTMENT (OUTPATIENT)
Dept: INTERNAL MEDICINE | Facility: CLINIC | Age: 64
End: 2024-01-09
Payer: MEDICARE

## 2024-01-09 VITALS
BODY MASS INDEX: 26.83 KG/M2 | SYSTOLIC BLOOD PRESSURE: 118 MMHG | WEIGHT: 177 LBS | HEIGHT: 68 IN | TEMPERATURE: 97.4 F | DIASTOLIC BLOOD PRESSURE: 75 MMHG | OXYGEN SATURATION: 97 % | HEART RATE: 64 BPM | RESPIRATION RATE: 15 BRPM

## 2024-01-09 PROCEDURE — 99214 OFFICE O/P EST MOD 30 MIN: CPT

## 2024-01-11 NOTE — REVIEW OF SYSTEMS
[Nasal Discharge] : nasal discharge [Cough] : cough [Negative] : Eyes [Postnasal Drip] : no postnasal drip [Sore Throat] : no sore throat [Hoarseness] : no hoarseness [Dyspnea on Exertion] : not dyspnea on exertion [FreeTextEntry7] : as hpi

## 2024-01-11 NOTE — ASSESSMENT
[FreeTextEntry1] : Patient presented today as he continues to have persistent dry cough.  Patient stated his symptoms are a lot better after he discontinued lisinopril but every now and then he will still have a prolonged cough.  He said benzonatate did not help him that much. He said cough is mostly bad in the morning. On examination he still have congestion and nasal sinuses.  Patient is not compliant with nasal spray.  Advised patient to take Flonase nasal spray consistently every day for a week or 10 days.  Also advised him to take a antihistamine 1 a day. Chronic cough could be also from acid reflux.  Patient mentioned that he has been having epigastric discomfort and bloating also going on for 2 weeks.  I will prescribe him a trial of pantoprazole 40 mg once a day in empty stomach. If symptoms not getting better follow-up in 1 month we will consider to refer him to ENT specialist.

## 2024-01-11 NOTE — HISTORY OF PRESENT ILLNESS
[FreeTextEntry1] : Follow-up on dry cough, discomfort in epigastric area. [de-identified] : Mr. MEDINA is here today stating that he continues to have dry cough. Patient stated he was using Flonase and took benzonatate for few days his symptoms were little better he went to see cardiology which stopped lisinopril as it could be the side effect patient stated after stopping lisinopril his symptoms got much better. But every now and then he is still having cough. Also complaining of discomfort in the epigastric area

## 2024-01-11 NOTE — PHYSICAL EXAM
[Normal] : soft, non-tender, non-distended, no masses palpated, no HSM and normal bowel sounds [de-identified] : Erythematous and edematous bilateral nasal mucosa.

## 2024-01-12 ENCOUNTER — NON-APPOINTMENT (OUTPATIENT)
Age: 64
End: 2024-01-12

## 2024-01-12 ENCOUNTER — APPOINTMENT (OUTPATIENT)
Dept: CARDIOLOGY | Facility: CLINIC | Age: 64
End: 2024-01-12
Payer: MEDICARE

## 2024-01-12 VITALS
WEIGHT: 177 LBS | HEART RATE: 77 BPM | DIASTOLIC BLOOD PRESSURE: 72 MMHG | OXYGEN SATURATION: 97 % | SYSTOLIC BLOOD PRESSURE: 120 MMHG | BODY MASS INDEX: 26.91 KG/M2

## 2024-01-12 DIAGNOSIS — I34.0 NONRHEUMATIC MITRAL (VALVE) INSUFFICIENCY: ICD-10-CM

## 2024-01-12 DIAGNOSIS — I63.9 CEREBRAL INFARCTION, UNSPECIFIED: ICD-10-CM

## 2024-01-12 PROCEDURE — 93000 ELECTROCARDIOGRAM COMPLETE: CPT

## 2024-01-12 PROCEDURE — 99214 OFFICE O/P EST MOD 30 MIN: CPT

## 2024-01-12 NOTE — HISTORY OF PRESENT ILLNESS
[FreeTextEntry1] : Jermaine Jack is a 63-year-old man with a history of recent subendocardial myocardial infarction (ramus coronary artery, 11/30/2023), pulmonary embolism (February 2023), obstructive sleep apnea, lumbar radiculopathy who returns for cardiac examination.  Cholesterol was not at goal when checked last week and I prescribed ezetimibe.  He has been feeling well; no angina; mild dyspnea with climbing stairs but usually not with other activities.  He has been tolerating prescribed pharmacotherapy.

## 2024-01-12 NOTE — DISCUSSION/SUMMARY
[With Me] : with me [___ Month(s)] : in [unfilled] month(s) [FreeTextEntry1] :  Coronary artery disease: Stable; Continue medical management with metoprolol, atorvastatin; No ACE inhibitor because of cough and normotension.  Ischemic cardiomyopathy: Mild LV dysfunction on ventriculography; anticipate reevaluation LV function later this year.  Aortic/mitral insufficiencies: Mild.  Hyperlipidemia: Improved but LDL was not at goal; cholesterol we will be rechecked in approximately 2 months after recently adding acetamide

## 2024-01-12 NOTE — CARDIOLOGY SUMMARY
[de-identified] : 1/12/2024. Sinus rhythm with nonspecific T abnormality [de-identified] : 12/1/2023.  Normal left ventricular size and function with estimated ejection fraction 55%.  Normal right ventricular size and function.  Mild mitral regurgitation.  Mild, eccentric aortic regurgitation.  Mild tricuspid regurgitation. [de-identified] : 11/30/2023.  LAD: Mild atherosclerosis (vessel arises anomalously from the right cusp).  Left circumflex: Minor irregularities.  RCA: Minor irregularities.  First right posterolateral: Small vessel with 80% stenosis in the distal third portion.  Proximal ramus intermedius: 90% stenosis.  Mid ramus intermedius: 90% stenosis.  Ventriculography revealed anterobasal and anterolateral hypokinesis with ejection fraction estimated at 45%.  Successful drug-eluting stent deployment of a long, complex ramus stenosis.

## 2024-01-12 NOTE — PHYSICAL EXAM
[Normal S1, S2] : normal S1, S2 [Clear Lung Fields] : clear lung fields [Normal Gait] : normal gait [No Edema] : no edema [Alert and Oriented] : alert and oriented [No Murmur] : no murmur [de-identified] : Overweight [de-identified] : Extraocular muscles are intact

## 2024-01-24 ENCOUNTER — RX CHANGE (OUTPATIENT)
Age: 64
End: 2024-01-24

## 2024-01-30 NOTE — ASU PREOP CHECKLIST - IDENTIFICATION BAND VERIFIED
Chief Complaint: pap and pelvic exam     History of Present Illness:   Anabelle Forrest is a 58 year old female who returns for breast and pap/pelvic exam today. Preventative exam previously completed on 1/4/24. BP checked by nursing today and noted to be normal and Amlodipine    Current Outpatient Medications   Medication Sig Dispense Refill    amLODIPine (NORVASC) 5 MG tablet Take 1 tablet by mouth daily. 90 tablet 3    Multiple Vitamins-Minerals (vitamin - therapeutic multivitamins w/minerals) tablet Take 1 tablet by mouth daily.      NON FORMULARY Take 2 tablets by mouth daily. Super beet chewable      Multiple Vitamins-Minerals (ZINC PO) Take 1 capsule by mouth daily.      Cyanocobalamin (B-12) 1000 MCG Tab Take 1 tablet by mouth daily.      MAGNESIUM PO Take 500 mg by mouth daily.       No current facility-administered medications for this visit.       ALLERGIES:   Allergen Reactions    Penicillins HIVES        No past medical history on file.    No past surgical history on file.    Family History   Problem Relation Age of Onset    Bone cancer Mother     Heart disease Father     Osteoarthritis Sister        PHYSICAL EXAM: Pleasant 58 year old female presents with normal mood and affect. She is able to answer all questions asked of her with appropriate answers.     Vital Signs    Vitals:    01/30/24 0752   BP: 138/80   BP Location: LUE - Left upper extremity   Patient Position: Sitting   Cuff Size: Large Adult   Pulse: 76   SpO2: 100%   Weight: 107.9 kg (237 lb 12.8 oz)   Height: 5' 2\" (1.575 m)       BREAST EXAM: No skin lesions. Clinical breast exam reveals no suspicious mass, nodules, or change in breast contour or consistency. There is no axillary or supraclavicular adenopathy appreciated. GYN: Normal external female genitalia as well as vaginal mucosa. Cervix is pink, smooth and without lesions. Thinprep pap smear is obtained without difficulty. Manual exams reveals absence of any cervical motion  tenderness or adenexa mass. Uterus nonenlarged.   SKIN: Warm, moist and without erythema, rash, or lesions. Turgor appears good.    ASSESSMENT:     1. Screening for cervical cancer    2. Screening for HPV (human papillomavirus)    3. Essential hypertension           PLAN:     1.  Pap collected today and will be sent to lab for processing. She will be called with these results when they are available.                done

## 2024-01-31 ENCOUNTER — OFFICE (OUTPATIENT)
Dept: URBAN - METROPOLITAN AREA CLINIC 115 | Facility: CLINIC | Age: 64
Setting detail: OPHTHALMOLOGY
End: 2024-01-31
Payer: MEDICARE

## 2024-01-31 DIAGNOSIS — H47.212: ICD-10-CM

## 2024-01-31 DIAGNOSIS — H35.033: ICD-10-CM

## 2024-01-31 DIAGNOSIS — H35.54: ICD-10-CM

## 2024-01-31 DIAGNOSIS — H43.812: ICD-10-CM

## 2024-01-31 DIAGNOSIS — G43.109: ICD-10-CM

## 2024-01-31 DIAGNOSIS — H25.13: ICD-10-CM

## 2024-01-31 DIAGNOSIS — H53.433: ICD-10-CM

## 2024-01-31 DIAGNOSIS — H47.012: ICD-10-CM

## 2024-01-31 DIAGNOSIS — H40.1132: ICD-10-CM

## 2024-01-31 PROBLEM — H52.7 REFRACTIVE ERROR: Status: ACTIVE | Noted: 2024-01-31

## 2024-01-31 PROCEDURE — 92083 EXTENDED VISUAL FIELD XM: CPT | Performed by: OPHTHALMOLOGY

## 2024-01-31 PROCEDURE — 92014 COMPRE OPH EXAM EST PT 1/>: CPT | Performed by: OPHTHALMOLOGY

## 2024-01-31 PROCEDURE — 92202 OPSCPY EXTND ON/MAC DRAW: CPT | Performed by: OPHTHALMOLOGY

## 2024-01-31 PROCEDURE — 92133 CPTRZD OPH DX IMG PST SGM ON: CPT | Performed by: OPHTHALMOLOGY

## 2024-01-31 ASSESSMENT — CONFRONTATIONAL VISUAL FIELD TEST (CVF)
OD_FINDINGS: FULL
OS_FINDINGS: FULL

## 2024-01-31 ASSESSMENT — REFRACTION_CURRENTRX
OD_ADD: +2.25
OD_OVR_VA: 20/
OS_CYLINDER: -4.50
OS_SPHERE: -4.75
OS_AXIS: 071
OD_CYLINDER: -6.00
OS_ADD: +2.25
OD_AXIS: 077
OD_VPRISM_DIRECTION: PROGS
OD_SPHERE: -3.00
OS_VPRISM_DIRECTION: PROGS
OS_OVR_VA: 20/

## 2024-03-11 ENCOUNTER — APPOINTMENT (OUTPATIENT)
Dept: INTERNAL MEDICINE | Facility: CLINIC | Age: 64
End: 2024-03-11
Payer: MEDICARE

## 2024-03-11 VITALS
OXYGEN SATURATION: 98 % | BODY MASS INDEX: 27.89 KG/M2 | DIASTOLIC BLOOD PRESSURE: 78 MMHG | HEART RATE: 57 BPM | TEMPERATURE: 97.7 F | RESPIRATION RATE: 15 BRPM | HEIGHT: 68 IN | WEIGHT: 184 LBS | SYSTOLIC BLOOD PRESSURE: 143 MMHG

## 2024-03-11 DIAGNOSIS — Z87.09 PERSONAL HISTORY OF OTHER DISEASES OF THE RESPIRATORY SYSTEM: ICD-10-CM

## 2024-03-11 DIAGNOSIS — H40.9 UNSPECIFIED GLAUCOMA: ICD-10-CM

## 2024-03-11 DIAGNOSIS — Z00.00 ENCOUNTER FOR GENERAL ADULT MEDICAL EXAMINATION W/OUT ABNORMAL FINDINGS: ICD-10-CM

## 2024-03-11 DIAGNOSIS — Z13.31 ENCOUNTER FOR SCREENING FOR DEPRESSION: ICD-10-CM

## 2024-03-11 DIAGNOSIS — K21.9 GASTRO-ESOPHAGEAL REFLUX DISEASE W/OUT ESOPHAGITIS: ICD-10-CM

## 2024-03-11 DIAGNOSIS — G47.33 OBSTRUCTIVE SLEEP APNEA (ADULT) (PEDIATRIC): ICD-10-CM

## 2024-03-11 DIAGNOSIS — Z87.898 PERSONAL HISTORY OF OTHER SPECIFIED CONDITIONS: ICD-10-CM

## 2024-03-11 PROCEDURE — 36415 COLL VENOUS BLD VENIPUNCTURE: CPT

## 2024-03-11 PROCEDURE — G0439: CPT

## 2024-03-11 NOTE — REVIEW OF SYSTEMS
[Cough] : cough [Negative] : Psychiatric [Shortness Of Breath] : no shortness of breath [Wheezing] : no wheezing [Dyspnea on Exertion] : not dyspnea on exertion [Dysuria] : no dysuria [Frequency] : no frequency [Hesitancy] : no hesitancy [Impotence] : no impotency [Skin Rash] : no skin rash [FreeTextEntry4] : as hpi [FreeTextEntry9] : pain right hip

## 2024-03-11 NOTE — ASSESSMENT
[FreeTextEntry1] : Mr. MEDINA is here today  for annual wellness exam. Ordered comprehensive blood work , screen for hyperlipidemia , diabetes and thyroid disorder. PSA for prostate CA screening labs drawn in the office. The results will be reviewed, and any abnormalities will be addressed accordingly.  Received   flu vaccine deferred  tdap, PPSV  , shingles vaccine.  Received  COVID vaccine Declined for HIV and Hep C  screening test. alcohol screening :SIBRT:0 Depression screening:PHQ2:0 up to date with colonoscopy , result normal, he is to schedule repeat.   Coronary artery disease: He recently had a acute subendocardial infarction and severe stenosis of ramus coronary artery, ischemic cardiomyopathy, moderate aortic regurgitation.  Status post PCI. He is currently on atorvastatin, plavix ,ASA and metoprolol. Under the care of cardiology. Patient is currently asymptomatic from cardiac standpoint.  History of pulmonary embolism: Hypercoagulable workup negative.    Obstructive sleep apnea: using CPAP.  GERD: Symptoms are well-controlled not taking pantoprazole.  Hyperlipidemia ordered fasting lipid profile.  He is currently on atorvastatin 80 mg and Zetia 10 mg. Advise low-fat diet and exercise  Right hip pain:> He will follow-up with orthopedist Follow-up in 6 months.

## 2024-03-11 NOTE — HEALTH RISK ASSESSMENT
[Very Good] : ~his/her~ current health as very good [Monthly or less (1 pt)] : Monthly or less (1 point) [Never (0 pts)] : Never (0 points) [1 or 2 (0 pts)] : 1 or 2 (0 points) [Little interest or pleasure doing things] : 1) Little interest or pleasure doing things [0] : 2) Feeling down, depressed, or hopeless: Not at all (0) [Feeling down, depressed, or hopeless] : 2) Feeling down, depressed, or hopeless [PHQ-2 Negative - No further assessment needed] : PHQ-2 Negative - No further assessment needed [Patient reported colonoscopy was normal] : Patient reported colonoscopy was normal [Hepatitis C test declined] : Hepatitis C test declined [] :  [HIV test declined] : HIV test declined [Fully functional (bathing, dressing, toileting, transferring, walking, feeding)] : Fully functional (bathing, dressing, toileting, transferring, walking, feeding) [Fully functional (using the telephone, shopping, preparing meals, housekeeping, doing laundry, using] : Fully functional and needs no help or supervision to perform IADLs (using the telephone, shopping, preparing meals, housekeeping, doing laundry, using transportation, managing medications and managing finances) [Smoke Detector] : smoke detector [Seat Belt] :  uses seat belt [Never] : Never [No falls in past year] : Patient reported no falls in the past year [No] : No [[id number]] : 0 [Audit-CScore] : 0 [Change in mental status noted] : No change in mental status noted [Retired] : retired [Reports changes in hearing] : Reports no changes in hearing [Reports changes in vision] : Reports no changes in vision [ColonoscopyDate] : 1/2019 [ColonoscopyComments] : He will schedule colonoscopy. [Designated Healthcare Proxy] : Designated healthcare proxy [Relationship: ___] : Relationship: [unfilled] [AdvancecareDate] : 3/24

## 2024-03-11 NOTE — HISTORY OF PRESENT ILLNESS
[Spouse] : spouse [FreeTextEntry1] : Annual wellness exam [de-identified] : Mr. LEON MEDINA is a 63 year male, presents today for [facility] exam  PMHX:  02/23 he had a acute PE, he was on anticoagulation for 3 months saw hematology hypercoagulable workup was negative.  And Eliquis was discontinued. On 11/3/2023 he had heart attack warning acute subendocardial infarction, severe stenosis of the ramus artery had PCI is currently on statin and Plavix. Chron's disease: Symptoms are well-controlled not on treatment.  Last colonoscopy was 5 years ago GERD symptoms are well-controlled not taking pantoprazole. Sleep apnea compliant with CPAP machine.  Patient stated he saw ENT for chronic cough he was treated with antibiotics for sinusitis and nasal polyps for 20 days.  His cough resolved after that. Patient stated he plays pickle ball once a week.  He denied any dyspnea or chest pain..  He takes small breaks in between.  Goes for a walk 1 to 2 days a week also does elliptical few days No change in bowel and bladder habit.

## 2024-03-11 NOTE — PHYSICAL EXAM
[No Edema] : there was no peripheral edema [Normal] : no joint swelling and grossly normal strength and tone [Normal Affect] : the affect was normal [No Rash] : no rash [Normal Insight/Judgement] : insight and judgment were intact [de-identified] : Erythematous and edematous bilateral nasal mucosa.

## 2024-03-12 LAB
ALBUMIN SERPL ELPH-MCNC: 4.3 G/DL
ALP BLD-CCNC: 68 U/L
ALT SERPL-CCNC: 22 U/L
ANION GAP SERPL CALC-SCNC: 12 MMOL/L
APPEARANCE: CLEAR
AST SERPL-CCNC: 15 U/L
BACTERIA: NEGATIVE /HPF
BASOPHILS # BLD AUTO: 0.16 K/UL
BASOPHILS NFR BLD AUTO: 1.6 %
BILIRUB SERPL-MCNC: 1.8 MG/DL
BILIRUBIN URINE: NEGATIVE
BLOOD URINE: NEGATIVE
BUN SERPL-MCNC: 17 MG/DL
CALCIUM SERPL-MCNC: 9.5 MG/DL
CAST: 1 /LPF
CHLORIDE SERPL-SCNC: 105 MMOL/L
CHOLEST SERPL-MCNC: 111 MG/DL
CO2 SERPL-SCNC: 24 MMOL/L
COLOR: YELLOW
CREAT SERPL-MCNC: 1.16 MG/DL
EGFR: 69 ML/MIN/1.73M2
EOSINOPHIL # BLD AUTO: 1.2 K/UL
EOSINOPHIL NFR BLD AUTO: 11.9 %
EPITHELIAL CELLS: 0 /HPF
ESTIMATED AVERAGE GLUCOSE: 108 MG/DL
GLUCOSE QUALITATIVE U: NEGATIVE MG/DL
GLUCOSE SERPL-MCNC: 99 MG/DL
HBA1C MFR BLD HPLC: 5.4 %
HCT VFR BLD CALC: 48.2 %
HDLC SERPL-MCNC: 54 MG/DL
HGB BLD-MCNC: 15.2 G/DL
IMM GRANULOCYTES NFR BLD AUTO: 0.2 %
KETONES URINE: NEGATIVE MG/DL
LDLC SERPL CALC-MCNC: 44 MG/DL
LEUKOCYTE ESTERASE URINE: NEGATIVE
LYMPHOCYTES # BLD AUTO: 2.48 K/UL
LYMPHOCYTES NFR BLD AUTO: 24.6 %
MAN DIFF?: NORMAL
MCHC RBC-ENTMCNC: 28.8 PG
MCHC RBC-ENTMCNC: 31.5 GM/DL
MCV RBC AUTO: 91.5 FL
MICROSCOPIC-UA: NORMAL
MONOCYTES # BLD AUTO: 0.89 K/UL
MONOCYTES NFR BLD AUTO: 8.8 %
NEUTROPHILS # BLD AUTO: 5.32 K/UL
NEUTROPHILS NFR BLD AUTO: 52.9 %
NITRITE URINE: NEGATIVE
NONHDLC SERPL-MCNC: 57 MG/DL
PH URINE: 5.5
PLATELET # BLD AUTO: 300 K/UL
POTASSIUM SERPL-SCNC: 4.3 MMOL/L
PROT SERPL-MCNC: 6.7 G/DL
PROTEIN URINE: NEGATIVE MG/DL
PSA SERPL-MCNC: 1.06 NG/ML
RBC # BLD: 5.27 M/UL
RBC # FLD: 13.6 %
RED BLOOD CELLS URINE: 1 /HPF
SODIUM SERPL-SCNC: 141 MMOL/L
SPECIFIC GRAVITY URINE: 1.02
TRIGL SERPL-MCNC: 53 MG/DL
TSH SERPL-ACNC: 1.95 UIU/ML
UROBILINOGEN URINE: 0.2 MG/DL
WBC # FLD AUTO: 10.07 K/UL
WHITE BLOOD CELLS URINE: 1 /HPF

## 2024-04-12 ENCOUNTER — NON-APPOINTMENT (OUTPATIENT)
Age: 64
End: 2024-04-12

## 2024-04-12 ENCOUNTER — APPOINTMENT (OUTPATIENT)
Dept: CARDIOLOGY | Facility: CLINIC | Age: 64
End: 2024-04-12
Payer: MEDICARE

## 2024-04-12 VITALS
HEART RATE: 65 BPM | BODY MASS INDEX: 27.74 KG/M2 | DIASTOLIC BLOOD PRESSURE: 70 MMHG | HEIGHT: 68 IN | WEIGHT: 183 LBS | OXYGEN SATURATION: 99 % | SYSTOLIC BLOOD PRESSURE: 122 MMHG

## 2024-04-12 VITALS
DIASTOLIC BLOOD PRESSURE: 70 MMHG | BODY MASS INDEX: 27.74 KG/M2 | WEIGHT: 183 LBS | OXYGEN SATURATION: 99 % | HEART RATE: 65 BPM | RESPIRATION RATE: 15 BRPM | SYSTOLIC BLOOD PRESSURE: 122 MMHG | HEIGHT: 68 IN

## 2024-04-12 DIAGNOSIS — I35.1 NONRHEUMATIC AORTIC (VALVE) INSUFFICIENCY: ICD-10-CM

## 2024-04-12 DIAGNOSIS — I21.4 NON-ST ELEVATION (NSTEMI) MYOCARDIAL INFARCTION: ICD-10-CM

## 2024-04-12 DIAGNOSIS — E78.5 HYPERLIPIDEMIA, UNSPECIFIED: ICD-10-CM

## 2024-04-12 DIAGNOSIS — I25.5 ISCHEMIC CARDIOMYOPATHY: ICD-10-CM

## 2024-04-12 DIAGNOSIS — I25.10 ATHEROSCLEROTIC HEART DISEASE OF NATIVE CORONARY ARTERY W/OUT ANGINA PECTORIS: ICD-10-CM

## 2024-04-12 PROCEDURE — G2211 COMPLEX E/M VISIT ADD ON: CPT

## 2024-04-12 PROCEDURE — 99214 OFFICE O/P EST MOD 30 MIN: CPT

## 2024-04-12 PROCEDURE — 93000 ELECTROCARDIOGRAM COMPLETE: CPT

## 2024-04-12 RX ORDER — PANTOPRAZOLE 40 MG/1
40 TABLET, DELAYED RELEASE ORAL
Qty: 90 | Refills: 0 | Status: DISCONTINUED | COMMUNITY
Start: 2024-01-24 | End: 2024-04-12

## 2024-04-12 NOTE — CARDIOLOGY SUMMARY
[de-identified] : 4/12/2024.  Sinus Rhythm with 1st degree AV block ( msec).  Nonspecific T-abnormality. [de-identified] : 12/1/2023.  Normal left ventricular size and function with estimated ejection fraction 55%.  Normal right ventricular size and function.  Mild mitral regurgitation.  Mild, eccentric aortic regurgitation.  Mild tricuspid regurgitation. [de-identified] : 11/30/2023.  LAD: Mild atherosclerosis (vessel arises anomalously from the right cusp).  Left circumflex: Minor irregularities.  RCA: Minor irregularities.  First right posterolateral: Small vessel with 80% stenosis in the distal third portion.  Proximal ramus intermedius: 90% stenosis.  Mid ramus intermedius: 90% stenosis.  Ventriculography revealed anterobasal and anterolateral hypokinesis with ejection fraction estimated at 45%.  Successful drug-eluting stent deployment of a long, complex ramus stenosis.

## 2024-04-12 NOTE — DISCUSSION/SUMMARY
[With Me] : with me [___ Month(s)] : in [unfilled] month(s) [FreeTextEntry1] :  Coronary artery disease: Stable & doing well s/p NSTEMI and PCI last November; Continue metoprolol, atorvastatin, aspirin, and Plavix.   Ischemic cardiomyopathy: Mild LV dysfunction on ventriculography; no clinical HF.  Aortic/mitral insufficiencies: Mild. Observation.  Hyperlipidemia: LDL is now at goal with combination therapy of ezetimibe and atorvastatin.

## 2024-04-12 NOTE — REVIEW OF SYSTEMS
[Negative] : Heme/Lymph [Weight Gain (___ Lbs)] : [unfilled] ~Ulb weight gain [Sinus Pressure] : sinus pressure [Chest Discomfort] : no chest discomfort [Joint Pain] : joint pain

## 2024-04-12 NOTE — HISTORY OF PRESENT ILLNESS
[FreeTextEntry1] : Jermaine Jack is a 64-year-old man with a history of subendocardial myocardial infarction (11/2023) treated with ramus coronary artery stent), pulmonary embolism (February 2023), obstructive sleep apnea, lumbar radiculopathy who returns for cardiac examination.   He has been playing [place] ball; describes some right hip bursitis but no angina.  He is tolerating all prescribed pharmacotherapy.  *I reviewed a lipid panel from last month and cholesterol has improved/now at goal (total 111, LDL 44, HDL 54, and triglyceride 53).  Note: LDL from August 2023 was 146 and January 2024: 83  ** This visit was conducted as part of ongoing, longitudinal medical care for patient's chronic medical diagnoses and other issues.

## 2024-04-12 NOTE — PHYSICAL EXAM
[Normal S1, S2] : normal S1, S2 [Clear Lung Fields] : clear lung fields [Normal Gait] : normal gait [No Edema] : no edema [Alert and Oriented] : alert and oriented [No Acute Distress] : no acute distress [de-identified] : Overweight

## 2024-06-10 DIAGNOSIS — Z12.5 ENCOUNTER FOR SCREENING FOR MALIGNANT NEOPLASM OF PROSTATE: ICD-10-CM

## 2024-07-31 ENCOUNTER — OFFICE (OUTPATIENT)
Dept: URBAN - METROPOLITAN AREA CLINIC 115 | Facility: CLINIC | Age: 64
Setting detail: OPHTHALMOLOGY
End: 2024-07-31
Payer: MEDICARE

## 2024-07-31 DIAGNOSIS — H47.212: ICD-10-CM

## 2024-07-31 DIAGNOSIS — H40.1132: ICD-10-CM

## 2024-07-31 DIAGNOSIS — G43.109: ICD-10-CM

## 2024-07-31 DIAGNOSIS — H25.13: ICD-10-CM

## 2024-07-31 DIAGNOSIS — H35.54: ICD-10-CM

## 2024-07-31 DIAGNOSIS — H47.012: ICD-10-CM

## 2024-07-31 DIAGNOSIS — H53.433: ICD-10-CM

## 2024-07-31 PROCEDURE — 92083 EXTENDED VISUAL FIELD XM: CPT | Performed by: OPHTHALMOLOGY

## 2024-07-31 PROCEDURE — 92133 CPTRZD OPH DX IMG PST SGM ON: CPT | Performed by: OPHTHALMOLOGY

## 2024-07-31 PROCEDURE — 99213 OFFICE O/P EST LOW 20 MIN: CPT | Performed by: OPHTHALMOLOGY

## 2024-07-31 ASSESSMENT — CONFRONTATIONAL VISUAL FIELD TEST (CVF)
OS_FINDINGS: FULL
OD_FINDINGS: FULL

## 2024-10-04 ENCOUNTER — NON-APPOINTMENT (OUTPATIENT)
Age: 64
End: 2024-10-04

## 2024-10-04 ENCOUNTER — APPOINTMENT (OUTPATIENT)
Dept: CARDIOLOGY | Facility: CLINIC | Age: 64
End: 2024-10-04
Payer: MEDICARE

## 2024-10-04 VITALS
WEIGHT: 184 LBS | HEART RATE: 57 BPM | DIASTOLIC BLOOD PRESSURE: 70 MMHG | HEIGHT: 68 IN | SYSTOLIC BLOOD PRESSURE: 130 MMHG | OXYGEN SATURATION: 98 % | BODY MASS INDEX: 27.89 KG/M2

## 2024-10-04 DIAGNOSIS — Z86.79 PERSONAL HISTORY OF OTHER DISEASES OF THE CIRCULATORY SYSTEM: ICD-10-CM

## 2024-10-04 DIAGNOSIS — I35.1 NONRHEUMATIC AORTIC (VALVE) INSUFFICIENCY: ICD-10-CM

## 2024-10-04 DIAGNOSIS — E78.5 HYPERLIPIDEMIA, UNSPECIFIED: ICD-10-CM

## 2024-10-04 DIAGNOSIS — I25.10 ATHEROSCLEROTIC HEART DISEASE OF NATIVE CORONARY ARTERY W/OUT ANGINA PECTORIS: ICD-10-CM

## 2024-10-04 PROCEDURE — 99214 OFFICE O/P EST MOD 30 MIN: CPT

## 2024-10-04 PROCEDURE — 93000 ELECTROCARDIOGRAM COMPLETE: CPT

## 2024-10-04 PROCEDURE — G2211 COMPLEX E/M VISIT ADD ON: CPT

## 2024-10-04 NOTE — PHYSICAL EXAM
[No Acute Distress] : no acute distress [Normal S1, S2] : normal S1, S2 [Clear Lung Fields] : clear lung fields [Normal Gait] : normal gait [No Edema] : no edema [Alert and Oriented] : alert and oriented [Good Air Entry] : good air entry [de-identified] : Overweight [de-identified] : No JVD or carotid bruit

## 2024-10-04 NOTE — REVIEW OF SYSTEMS
[Joint Pain] : joint pain [Negative] : Heme/Lymph [Chest Discomfort] : no chest discomfort [FreeTextEntry2] : Stable weight [FreeTextEntry6] : Occasional cough

## 2024-10-04 NOTE — DISCUSSION/SUMMARY
[With Me] : with me [___ Month(s)] : in [unfilled] month(s) [FreeTextEntry1] :  Coronary artery disease: Stable; continue metoprolol, atorvastatin, aspirin; stop Plavix 12/1/2024.  Aortic/mitral insufficiencies: Mild. There is a history of bicuspid aortic valve (son) and aortic valve insufficiency was eccentric on echocardiography last year--I recommend repeat imaging when he returns to see me in the spring.  Hyperlipidemia: Controlled; continue ezetimibe and atorvastatin.

## 2024-10-04 NOTE — HISTORY OF PRESENT ILLNESS
[FreeTextEntry1] : Jermaine Jack is a 64-year-old man with a history of subendocardial myocardial infarction (11/2023) treated with ramus coronary artery stent, pulmonary embolism (2/2023), obstructive sleep apnea, and lumbar radiculopathy who returns for cardiac examination.  He has been feeling well since I last saw him.  He remains active--plays pickle ball and golfs.  He denies angina.  He had been tolerating all Rx.  Still experiencing discomfort in his hip due to bursitis but managing it conservatively with satisfactory results.  *I reviewed a lipid panel from March 2024 revealing excellent control (total cholesterol 111, LDL 44, triglyceride 53, and HDL 54).  ** This visit was conducted as part of ongoing, longitudinal medical care for patient's chronic medical diagnoses and other issues.

## 2024-10-04 NOTE — CARDIOLOGY SUMMARY
[de-identified] : 10/4/2024. Sinus Bradycardia with 1st degree AV block.  Nonspecific T-abnormality. [de-identified] : 12/1/2023.  Normal left ventricular size and function with estimated ejection fraction 55%.  Normal right ventricular size and function.  Mild mitral regurgitation.  Mild, eccentric aortic regurgitation.  Mild tricuspid regurgitation. [de-identified] : 11/30/2023.  LAD: Mild atherosclerosis (vessel arises anomalously from the right cusp).  Left circumflex: Minor irregularities.  RCA: Minor irregularities.  First right posterolateral: Small vessel with 80% stenosis in the distal third portion.  Proximal ramus intermedius: 90% stenosis.  Mid ramus intermedius: 90% stenosis.  Ventriculography revealed anterobasal and anterolateral hypokinesis with ejection fraction estimated at 45%.  Successful drug-eluting stent deployment of a long, complex ramus stenosis.

## 2024-12-16 ENCOUNTER — RX RENEWAL (OUTPATIENT)
Age: 64
End: 2024-12-16

## 2025-02-05 ENCOUNTER — APPOINTMENT (OUTPATIENT)
Dept: VASCULAR SURGERY | Facility: CLINIC | Age: 65
End: 2025-02-05
Payer: MEDICARE

## 2025-02-05 VITALS
OXYGEN SATURATION: 98 % | WEIGHT: 172 LBS | HEIGHT: 68 IN | HEART RATE: 58 BPM | DIASTOLIC BLOOD PRESSURE: 68 MMHG | BODY MASS INDEX: 26.07 KG/M2 | SYSTOLIC BLOOD PRESSURE: 125 MMHG

## 2025-02-05 DIAGNOSIS — I87.2 VENOUS INSUFFICIENCY (CHRONIC) (PERIPHERAL): ICD-10-CM

## 2025-02-05 PROCEDURE — 93970 EXTREMITY STUDY: CPT

## 2025-02-05 PROCEDURE — 99203 OFFICE O/P NEW LOW 30 MIN: CPT

## 2025-02-13 DIAGNOSIS — Z00.00 ENCOUNTER FOR GENERAL ADULT MEDICAL EXAMINATION W/OUT ABNORMAL FINDINGS: ICD-10-CM

## 2025-03-12 ENCOUNTER — OFFICE (OUTPATIENT)
Dept: URBAN - METROPOLITAN AREA CLINIC 115 | Facility: CLINIC | Age: 65
Setting detail: OPHTHALMOLOGY
End: 2025-03-12
Payer: MEDICARE

## 2025-03-12 DIAGNOSIS — H53.433: ICD-10-CM

## 2025-03-12 DIAGNOSIS — H43.812: ICD-10-CM

## 2025-03-12 DIAGNOSIS — H25.13: ICD-10-CM

## 2025-03-12 DIAGNOSIS — H47.012: ICD-10-CM

## 2025-03-12 DIAGNOSIS — G43.109: ICD-10-CM

## 2025-03-12 DIAGNOSIS — H40.1132: ICD-10-CM

## 2025-03-12 DIAGNOSIS — H47.212: ICD-10-CM

## 2025-03-12 DIAGNOSIS — H35.033: ICD-10-CM

## 2025-03-12 DIAGNOSIS — H35.54: ICD-10-CM

## 2025-03-12 PROCEDURE — 92133 CPTRZD OPH DX IMG PST SGM ON: CPT | Performed by: OPHTHALMOLOGY

## 2025-03-12 PROCEDURE — 92202 OPSCPY EXTND ON/MAC DRAW: CPT | Performed by: OPHTHALMOLOGY

## 2025-03-12 PROCEDURE — 92083 EXTENDED VISUAL FIELD XM: CPT | Performed by: OPHTHALMOLOGY

## 2025-03-12 PROCEDURE — 92014 COMPRE OPH EXAM EST PT 1/>: CPT | Performed by: OPHTHALMOLOGY

## 2025-03-12 ASSESSMENT — REFRACTION_CURRENTRX
OD_OVR_VA: 20/
OS_SPHERE: -4.75
OD_VPRISM_DIRECTION: PROGS
OD_ADD: +2.25
OD_AXIS: 077
OS_OVR_VA: 20/
OS_AXIS: 071
OD_SPHERE: -3.00
OS_VPRISM_DIRECTION: PROGS
OS_ADD: +2.25
OD_CYLINDER: -6.00
OS_CYLINDER: -4.50

## 2025-03-12 ASSESSMENT — PACHYMETRY
OD_CT_UM: 533
OD_CT_CORRECTION: 1
OS_CT_CORRECTION: 1
OS_CT_UM: 530

## 2025-03-12 ASSESSMENT — REFRACTION_AUTOREFRACTION
OD_CYLINDER: -7.75
OS_AXIS: 077
OS_CYLINDER: -6.75
OD_SPHERE: -1.50
OD_AXIS: 086
OS_SPHERE: -3.00

## 2025-03-12 ASSESSMENT — TONOMETRY
OD_IOP_MMHG: 16
OS_IOP_MMHG: 15

## 2025-03-12 ASSESSMENT — REFRACTION_MANIFEST
OD_SPHERE: -1.75
OD_CYLINDER: -7.50
OD_AXIS: 080
OD_VA1: 20/40+3

## 2025-03-12 ASSESSMENT — VISUAL ACUITY
OD_BCVA: 20/60-1
OS_BCVA: 20/40

## 2025-03-12 ASSESSMENT — CONFRONTATIONAL VISUAL FIELD TEST (CVF)
OS_FINDINGS: FULL
OD_FINDINGS: FULL

## 2025-03-17 ENCOUNTER — NON-APPOINTMENT (OUTPATIENT)
Age: 65
End: 2025-03-17

## 2025-03-18 ENCOUNTER — APPOINTMENT (OUTPATIENT)
Dept: INTERNAL MEDICINE | Facility: CLINIC | Age: 65
End: 2025-03-18
Payer: MEDICARE

## 2025-03-18 VITALS
TEMPERATURE: 97.8 F | HEIGHT: 68 IN | WEIGHT: 171 LBS | RESPIRATION RATE: 15 BRPM | DIASTOLIC BLOOD PRESSURE: 76 MMHG | OXYGEN SATURATION: 100 % | HEART RATE: 55 BPM | SYSTOLIC BLOOD PRESSURE: 124 MMHG | BODY MASS INDEX: 25.91 KG/M2

## 2025-03-18 DIAGNOSIS — Z13.29 ENCOUNTER FOR SCREENING FOR OTHER SUSPECTED ENDOCRINE DISORDER: ICD-10-CM

## 2025-03-18 DIAGNOSIS — Z12.5 ENCOUNTER FOR SCREENING FOR MALIGNANT NEOPLASM OF PROSTATE: ICD-10-CM

## 2025-03-18 DIAGNOSIS — Z00.00 ENCOUNTER FOR GENERAL ADULT MEDICAL EXAMINATION W/OUT ABNORMAL FINDINGS: ICD-10-CM

## 2025-03-18 DIAGNOSIS — Z13.1 ENCOUNTER FOR SCREENING FOR DIABETES MELLITUS: ICD-10-CM

## 2025-03-18 DIAGNOSIS — Z87.19 PERSONAL HISTORY OF OTHER DISEASES OF THE DIGESTIVE SYSTEM: ICD-10-CM

## 2025-03-18 DIAGNOSIS — E78.5 HYPERLIPIDEMIA, UNSPECIFIED: ICD-10-CM

## 2025-03-18 DIAGNOSIS — Z12.11 ENCOUNTER FOR SCREENING FOR MALIGNANT NEOPLASM OF COLON: ICD-10-CM

## 2025-03-18 PROCEDURE — G0444 DEPRESSION SCREEN ANNUAL: CPT

## 2025-03-18 PROCEDURE — G0439: CPT

## 2025-03-18 PROCEDURE — 36415 COLL VENOUS BLD VENIPUNCTURE: CPT

## 2025-03-18 RX ORDER — MOMETASONE FUROATE 100 UG/1
100 AEROSOL RESPIRATORY (INHALATION)
Refills: 0 | Status: ACTIVE | COMMUNITY

## 2025-03-21 LAB
ALBUMIN SERPL ELPH-MCNC: 4.2 G/DL
ALP BLD-CCNC: 75 U/L
ALT SERPL-CCNC: 17 U/L
ANION GAP SERPL CALC-SCNC: 10 MMOL/L
APPEARANCE: CLEAR
AST SERPL-CCNC: 22 U/L
BACTERIA: NEGATIVE /HPF
BASOPHILS # BLD AUTO: 0.11 K/UL
BASOPHILS NFR BLD AUTO: 1.3 %
BILIRUB SERPL-MCNC: 1.8 MG/DL
BILIRUBIN URINE: NEGATIVE
BLOOD URINE: NEGATIVE
BUN SERPL-MCNC: 18 MG/DL
CALCIUM SERPL-MCNC: 9.3 MG/DL
CAST: 2 /LPF
CHLORIDE SERPL-SCNC: 104 MMOL/L
CHOLEST SERPL-MCNC: 104 MG/DL
CO2 SERPL-SCNC: 26 MMOL/L
COLOR: YELLOW
CREAT SERPL-MCNC: 1.22 MG/DL
EGFRCR SERPLBLD CKD-EPI 2021: 65 ML/MIN/1.73M2
EOSINOPHIL # BLD AUTO: 0.8 K/UL
EOSINOPHIL NFR BLD AUTO: 9.7 %
EPITHELIAL CELLS: 1 /HPF
ESTIMATED AVERAGE GLUCOSE: 108 MG/DL
GLUCOSE QUALITATIVE U: NEGATIVE MG/DL
GLUCOSE SERPL-MCNC: 100 MG/DL
HBA1C MFR BLD HPLC: 5.4 %
HCT VFR BLD CALC: 46.9 %
HDLC SERPL-MCNC: 46 MG/DL
HGB BLD-MCNC: 14.6 G/DL
IMM GRANULOCYTES NFR BLD AUTO: 0.1 %
KETONES URINE: NEGATIVE MG/DL
LDLC SERPL-MCNC: 46 MG/DL
LEUKOCYTE ESTERASE URINE: NEGATIVE
LYMPHOCYTES # BLD AUTO: 1.81 K/UL
LYMPHOCYTES NFR BLD AUTO: 22 %
MAN DIFF?: NORMAL
MCHC RBC-ENTMCNC: 29.1 PG
MCHC RBC-ENTMCNC: 31.1 G/DL
MCV RBC AUTO: 93.6 FL
MICROSCOPIC-UA: NORMAL
MONOCYTES # BLD AUTO: 0.66 K/UL
MONOCYTES NFR BLD AUTO: 8 %
NEUTROPHILS # BLD AUTO: 4.85 K/UL
NEUTROPHILS NFR BLD AUTO: 58.9 %
NITRITE URINE: NEGATIVE
NONHDLC SERPL-MCNC: 58 MG/DL
PH URINE: 5.5
PLATELET # BLD AUTO: 271 K/UL
POTASSIUM SERPL-SCNC: 4.4 MMOL/L
PROT SERPL-MCNC: 6.7 G/DL
PROTEIN URINE: NEGATIVE MG/DL
PSA SERPL-MCNC: 0.94 NG/ML
RBC # BLD: 5.01 M/UL
RBC # FLD: 14 %
RED BLOOD CELLS URINE: 2 /HPF
SODIUM SERPL-SCNC: 139 MMOL/L
SPECIFIC GRAVITY URINE: 1.02
TRIGL SERPL-MCNC: 45 MG/DL
TSH SERPL-ACNC: 1.96 UIU/ML
UROBILINOGEN URINE: 0.2 MG/DL
WBC # FLD AUTO: 8.24 K/UL
WHITE BLOOD CELLS URINE: 0 /HPF

## 2025-04-09 ENCOUNTER — APPOINTMENT (OUTPATIENT)
Dept: CARDIOLOGY | Facility: CLINIC | Age: 65
End: 2025-04-09
Payer: MEDICARE

## 2025-04-09 PROCEDURE — 93306 TTE W/DOPPLER COMPLETE: CPT

## 2025-04-17 ENCOUNTER — NON-APPOINTMENT (OUTPATIENT)
Age: 65
End: 2025-04-17

## 2025-04-17 ENCOUNTER — APPOINTMENT (OUTPATIENT)
Dept: CARDIOLOGY | Facility: CLINIC | Age: 65
End: 2025-04-17
Payer: MEDICARE

## 2025-04-17 VITALS
HEIGHT: 68 IN | SYSTOLIC BLOOD PRESSURE: 126 MMHG | WEIGHT: 166 LBS | HEART RATE: 63 BPM | DIASTOLIC BLOOD PRESSURE: 68 MMHG | OXYGEN SATURATION: 99 % | BODY MASS INDEX: 25.16 KG/M2

## 2025-04-17 DIAGNOSIS — I34.0 NONRHEUMATIC MITRAL (VALVE) INSUFFICIENCY: ICD-10-CM

## 2025-04-17 DIAGNOSIS — I25.10 ATHEROSCLEROTIC HEART DISEASE OF NATIVE CORONARY ARTERY W/OUT ANGINA PECTORIS: ICD-10-CM

## 2025-04-17 DIAGNOSIS — I35.1 NONRHEUMATIC AORTIC (VALVE) INSUFFICIENCY: ICD-10-CM

## 2025-04-17 DIAGNOSIS — E78.5 HYPERLIPIDEMIA, UNSPECIFIED: ICD-10-CM

## 2025-04-17 PROCEDURE — 99214 OFFICE O/P EST MOD 30 MIN: CPT

## 2025-04-17 PROCEDURE — G2211 COMPLEX E/M VISIT ADD ON: CPT

## 2025-04-17 PROCEDURE — 93000 ELECTROCARDIOGRAM COMPLETE: CPT
